# Patient Record
Sex: FEMALE | Race: WHITE | NOT HISPANIC OR LATINO | Employment: FULL TIME | URBAN - METROPOLITAN AREA
[De-identification: names, ages, dates, MRNs, and addresses within clinical notes are randomized per-mention and may not be internally consistent; named-entity substitution may affect disease eponyms.]

---

## 2024-02-23 ENCOUNTER — TRANSFERRED RECORDS (OUTPATIENT)
Dept: ONCOLOGY | Facility: CLINIC | Age: 69
End: 2024-02-23

## 2024-03-12 ENCOUNTER — HOSPITAL ENCOUNTER (OUTPATIENT)
Dept: MAMMOGRAPHY | Facility: CLINIC | Age: 69
Discharge: HOME | End: 2024-03-12
Attending: FAMILY MEDICINE | Admitting: FAMILY MEDICINE
Payer: COMMERCIAL

## 2024-03-12 DIAGNOSIS — Z12.31 VISIT FOR SCREENING MAMMOGRAM: ICD-10-CM

## 2024-03-12 PROCEDURE — 77063 BREAST TOMOSYNTHESIS BI: CPT

## 2024-03-19 ENCOUNTER — HOSPITAL ENCOUNTER (OUTPATIENT)
Dept: MAMMOGRAPHY | Facility: CLINIC | Age: 69
Discharge: HOME | End: 2024-03-19
Attending: FAMILY MEDICINE
Payer: COMMERCIAL

## 2024-03-19 DIAGNOSIS — R92.8 ABNORMAL MAMMOGRAM: ICD-10-CM

## 2024-03-19 PROCEDURE — 77062 BREAST TOMOSYNTHESIS BI: CPT

## 2024-03-19 PROCEDURE — 76642 ULTRASOUND BREAST LIMITED: CPT | Mod: LT

## 2024-03-24 ENCOUNTER — HEALTH MAINTENANCE LETTER (OUTPATIENT)
Age: 69
End: 2024-03-24

## 2024-03-28 ENCOUNTER — HOSPITAL ENCOUNTER (OUTPATIENT)
Dept: MAMMOGRAPHY | Facility: CLINIC | Age: 69
Discharge: HOME | End: 2024-03-28
Attending: FAMILY MEDICINE
Payer: COMMERCIAL

## 2024-03-28 DIAGNOSIS — R92.8 ABNORMAL MAMMOGRAM: ICD-10-CM

## 2024-03-28 PROCEDURE — 88342 IMHCHEM/IMCYTCHM 1ST ANTB: CPT | Mod: TC | Performed by: FAMILY MEDICINE

## 2024-03-28 PROCEDURE — 999N000065 MA POST PROCEDURE LEFT

## 2024-03-28 PROCEDURE — 250N000009 HC RX 250: Performed by: STUDENT IN AN ORGANIZED HEALTH CARE EDUCATION/TRAINING PROGRAM

## 2024-03-28 PROCEDURE — 272N000615 US BREAST BIOPSY CORE NEEDLE LEFT

## 2024-03-28 RX ORDER — ALENDRONATE SODIUM 10 MG/1
10 TABLET ORAL
COMMUNITY
Start: 2024-03-01 | End: 2024-10-02

## 2024-03-28 RX ADMIN — LIDOCAINE HYDROCHLORIDE 5 ML: 10 INJECTION, SOLUTION INFILTRATION; PERINEURAL at 10:12

## 2024-03-28 NOTE — DISCHARGE INSTRUCTIONS
After Your Breast Biopsy  Bleeding, bruising, and pain  Breast tenderness and some bruising is normal and may last several days. You may wear your bra overnight to support the breast.  You may use an ice pack for pain. Place it over the area for 15 to 20 minutes, several times a day.  You may take over-the-counter pain medicine:  On the day of the biopsy, we recommend Tylenol (acetaminophen) because it does not raise your risk of bleeding.  The next day, you may take an anti-inflammatory medicine (aspirin, ibuprofen, Motrin, Aleve, Advil), unless your doctor tells you not to.  Bandages and showering  Keep your bandage in place until tomorrow morning. Don't get it wet.  If you have small pieces of tape on the skin, leave them in place. They will fall off on their own, or you can remove them after 5 days.  You may shower the next morning after your biopsy.  Activity  No heavy activity (no running, no gym workouts, no lifting, no vacuuming, etc.) on the day of your biopsy.  You may go back to normal activity the next day. But limit what you do if you still have pain or discomfort.  Infection  Infection is rare. Signs of infection include:  Fever (including sweats and chills)  Redness  Pain that gets worse  Fluid draining from the biopsy site  Biopsy results  Results may take up to 5 business days.  A nurse or doctor from the Breast Center will call with your results. We will also send the results to the doctor that ordered your biopsy.  If you have not gotten your results in 5 days, please call the Breast Center.  Call the Breast Center with questions or if:   You have bleeding that lasts more than 20 minutes.  You have pain that you can't control.  You have signs of infection (fever, sweats, chills, redness, increasing pain, or drainage).  After hours, please call the doctor who ordered your biopsy.  For informational purposes only. Not to replace the advice of your health care provider. Copyright   2010 [facility]. All rights reserved. Clinically reviewed by Wendi Cruz, Director, [facility]. [employer] OPS [id number] - REV 08/23.

## 2024-04-01 PROCEDURE — 88360 TUMOR IMMUNOHISTOCHEM/MANUAL: CPT | Mod: 26 | Performed by: PATHOLOGY

## 2024-04-01 PROCEDURE — 88305 TISSUE EXAM BY PATHOLOGIST: CPT | Mod: 26 | Performed by: PATHOLOGY

## 2024-04-01 PROCEDURE — 88342 IMHCHEM/IMCYTCHM 1ST ANTB: CPT | Mod: 26 | Performed by: PATHOLOGY

## 2024-04-02 ENCOUNTER — TELEPHONE (OUTPATIENT)
Dept: MAMMOGRAPHY | Facility: CLINIC | Age: 69
End: 2024-04-02
Payer: COMMERCIAL

## 2024-04-02 NOTE — PROGRESS NOTES
Malignant Path:  Pathology report reviewed with our breast radiologist Dr. Jacqueline Coffey, who confirmed the recent breast imaging is concordant with the final surgical pathology results below.    Nurse Hafsa Blankenship from [facility] called me to inform that Hafsa Cueva PAC already called Ms. Dilma AGUILARMati Crockett this morning and notified patient of Ultrasound Guided Left Breast Biopsy results showing Invasive Ductal Carcinoma, grade 1.  Estrogen/ Progesterone Receptors are (+) positive, and HER2 is (-) negative.    I also called patient, confirmed her full name, date of birth, and we discussed her Left  Breast Needle biopsy results in great detail.    Patient states no problems with biopsy site.  Recommended follow up is Surgical Consult and Medical Oncology Consult.  Surgical Consult has been arranged with Dr Swati Pabon on 4/9/24 at 8:15 a.m., with check in time of 8:00 a.m. at the [facility].   Patient has directions and phone numbers.  We will discuss the Medical Oncology consult at patient's Surgical Consultation with Dr. Pabon.      Questions were answered and I explained my role as Breast Care Nurse Coordinator in assisting her with appointments, resources and social support.  New diagnosis information packet will be available for patient at surgical consult.  I will follow up with the patient. She has my phone number if she has further questions.  Patient verbalized understanding and agrees with the plan of care.  Ordering provider- Dr. Honey Rocha and Hafsa SHELTON have been notified of the results, recommendations for follow up, and scheduled surgical consultation.  I will forward this note, along with the pathology results.  Yakelin Carrion RN, BSN  Breast Care Nurse Coordinator  [facility] [place]  [phone]        Dilma Crockett [id number]  F,  1955  Surgical Pathology Report (Final result) [id number]  Authorizing Provider: Honey Rocha MD Ordering Provider: Honey Rocha MD  Ordering Location: [facility]  Collected: 03/28/2024 10:14 AM  Pathologist: Ky Jo MD Received: 03/28/2024 11:13 AM  .  Specimens  A Breast, Left  .  .  Final Diagnosis  LEFT breast, 1.5 cm mass, 8:00, 2.0 cm from nipple, ultrasound guided 14 gauge needle core biopsies:  - Invasive mammary (ductal) carcinoma of no special type:  - [place] grade: 1 (out of 3)  - Marko score: 4 (out of 9)  - Greatest linear length of tumor: 10 mm  - Multiple calcifications within invasive ductal carcinoma.  - In situ carcinoma: Not identified  - Lymphovascular invasions: Not identified  - Estrogen and progesterone receptors: Estrogen 100%, strong and Progesterone %, strong  - IHC HER2: Score 0  Electronically signed by Ky Jo MD on 4/1/2024 at 4:24 PM

## 2024-04-02 NOTE — TELEPHONE ENCOUNTER
Malignant Path:  Pathology report reviewed with our breast radiologist Dr. Jacqueline Coffey, who confirmed the recent breast imaging is concordant with the final surgical pathology results below.    Ultrasound Guided Left Breast Biopsy results show Invasive Ductal Carcinoma, grade 1.  Estrogen/ Progesterone Receptors are (+) positive, and HER2 is (-) negative.  Recommended follow up is Surgical and Medical Oncology consultations.    I called Dr. Rocha's office at [facility] ([phone]) and spoke with Dr. Rocha's nurse Hafsa Blankenship.   Informed Hafsa Blankenship of patient's breast biopsy results and the recommendations for patient to see surgery and Medical Oncology.  I faxed the report to Dr. Rocha at [phone].    Hafsa Blankenship will show Dr. Rocha the pathology results and call me back to ask for help in getting patient scheduled for consultations.  Awaiting return call.  Yakelin Carrion RN, BSN  Breast Care Nurse Coordinator  [facility] [place]  [phone]        Dilma Crockett [id number]  F, 1955  Surgical Pathology Report (Final result) [id number]  Authorizing Provider: Honey Rocha MD Ordering Provider: Honey Rocha MD  Ordering Location: [facility]  Collected: 03/28/2024 10:14 AM  Pathologist: Ky Jo MD Received: 03/28/2024 11:13 AM  .  Specimens  A Breast, Left  .  .  Final Diagnosis  LEFT breast, 1.5 cm mass, 8:00, 2.0 cm from nipple, ultrasound guided 14 gauge needle core biopsies:  - Invasive mammary (ductal) carcinoma of no special type:  - Marko grade: 1 (out of 3)  - [place] score: 4 (out of 9)  - Greatest linear length of tumor: 10 mm  - Multiple calcifications within invasive ductal carcinoma.  - In situ carcinoma: Not identified  - Lymphovascular invasions: Not identified  - Estrogen and progesterone receptors: Estrogen 100%, strong and Progesterone %,  strong  - IHC HER2: Score 0  Electronically signed by Ky Jo MD on 4/1/2024 at 4:24 PM

## 2024-04-02 NOTE — TELEPHONE ENCOUNTER
Dr. Rocha's nurse - Hafsa Blankenship called me back to inform that Hafsa SHELTON from [facility] (Dr. Rocha's office) already called patient this morning, and informed her of the biopsy results, recommendations for follow up, and patient is already scheduled for Surgical Consult with Dr. Swati Pabon on 4/9/24 at 8:15 a.m. at [facility].    I called patient as well and explained her surgical pathology results in great detail.  I answered all of patient's questions completely to her satisfaction.  I explained my role as nurse navigator and informed Dilma I will sit in on her consult to take notes and give her information regarding breast cancer and the various resources available in the [facility].    Patient has Dr. Pabon's clinic contact information and directions.  Patient also has my contact information.  Patient verbalizes understanding and agrees with the plan of care.  Yakelin Carrion RN BSN  Breast Nurse Care Coordinator  [facility] [place] [place]  [phone]

## 2024-04-05 ENCOUNTER — PATIENT OUTREACH (OUTPATIENT)
Dept: ONCOLOGY | Facility: CLINIC | Age: 69
End: 2024-04-05

## 2024-04-05 ENCOUNTER — OFFICE VISIT (OUTPATIENT)
Dept: SURGERY | Facility: CLINIC | Age: 69
End: 2024-04-05
Payer: COMMERCIAL

## 2024-04-05 ENCOUNTER — TELEPHONE (OUTPATIENT)
Dept: SURGERY | Facility: CLINIC | Age: 69
End: 2024-04-05

## 2024-04-05 VITALS
HEIGHT: 61 IN | DIASTOLIC BLOOD PRESSURE: 80 MMHG | BODY MASS INDEX: 23.6 KG/M2 | WEIGHT: 125 LBS | SYSTOLIC BLOOD PRESSURE: 128 MMHG | HEART RATE: 68 BPM

## 2024-04-05 DIAGNOSIS — Z17.0 MALIGNANT NEOPLASM OF LOWER-INNER QUADRANT OF LEFT BREAST IN FEMALE, ESTROGEN RECEPTOR POSITIVE (H): Primary | ICD-10-CM

## 2024-04-05 DIAGNOSIS — C50.312 MALIGNANT NEOPLASM OF LOWER-INNER QUADRANT OF LEFT BREAST IN FEMALE, ESTROGEN RECEPTOR POSITIVE (H): Primary | ICD-10-CM

## 2024-04-05 PROCEDURE — 99205 OFFICE O/P NEW HI 60 MIN: CPT | Performed by: SURGERY

## 2024-04-05 NOTE — NURSING NOTE
Breast Nurse Care Coordination:      I introduced self to patient and , and explained my role of breast nurse coordinator. I accompanied Dilma to her surgical consultation on 4/5/24 with Dr. Pabon at the [facility].       Dilma was given the new breast cancer patient packet which includes educational material and support resources such as American Cancer Society, Fighting Cancer through Diet and Lifestyle, [employer], [employer], Pathways and the [facility] Breast Cancer Support Group.  I also enclosed a copy of her left breast biopsy pathology report (3/28/24).       At the end of the consultation, we reviewed Dilma's plan of care and education.  The plan is for patient to have a left breast lumpectomy on 5/3/24. She is aware that she will need a pre op exam with her PCP within 30 days before surgery.     I gave patient educational materials regarding Exercises After Breast Surgery and Lumpectomy.  Informed patient for lumpectomy surgery, she will want to have two good supportive sports bras that open in the front to wear the 2 weeks following her surgery. I gave patient information on different options to purchase sports bras.     I answered her questions and encouraged patient to call me back with any future questions or concerns.  Dilma has my contact information, and knows to contact me in the future with any questions or concerns.     Therese Del Toro, RN, BSN, PHN  Breast Care Nurse Coordinator  [facility] [place] [place]  [phone]

## 2024-04-05 NOTE — PROGRESS NOTES
Writer received referral to Cancer Risk Management/Genetic Counseling.    Referred for:     genetic testing - pt with breast cancer        Referral reviewed for appropriate plan, and sent to New Patient Scheduling ([phone]) for completion.    Katerin Loredo, RN, BSN  Oncology New Patient Nurse Navigator   [facility]  [phone]

## 2024-04-05 NOTE — NURSING NOTE
Breast Patients      1-Do you have any of the following symptoms? Lump(s) or Mass(es)  2-In which breast are you having the symptoms? left  3-Have you had a Mammogram? Ramin Reece - Date:  3/12/24  4-Have you ever had a breast cyst drained? No  5-Have you ever had a breast biopsy? Yes:  Left   -   Date:  3/28/24  6-Have you ever had Breast Cancer? No   7-Is there a history of Breast Cancer in your family? Yes   Relationship to you:    Grandmother  8-Have you ever had Ovarian Cancer? No  9-Is there a history of Ovarian Cancer in your family? No  10-Is there a history of Colon Cancer in your family?  No  11-Is there a history of Uterine Cancer in your family?  No  12-Any known genetic abnormalities in your family?  No  13-Summarize your caffeine intake (i.e. coffee, tea, chocolate, soda etc.): 1 small jose eduardo tea per day, occasional soda       14-What age did your periods begin?  12    15-Date your last menstrual period began?  ?  16-Number of full-term pregnancies:  3    17-Your age when your first child was born? 31  18-Did you nurse your children? Yes  19-Are you pregnant now? No  20-Have you begun menopause? Yes, s/p hysterectomy in 1998  21-Have you had either ovary removed?Yes  Date of Surgery:  1998  22-Do you have breast implants? No   23-Have you used hormone replacement therapy?  No  24-Have you taken oral contraceptive pills?  Yes, For how many years?  5 years   25-Have you had an intrauterine device (IUD) placed?  No  26-What is your current bra size?  36C    Jaimee Badillo MA

## 2024-04-05 NOTE — TELEPHONE ENCOUNTER
Type of surgery: left breast tag localized lumpectomy  Location of surgery: [facility]  Date and time of surgery: 5/3/24 7:30am  Surgeon: Dr Pabon  Pre-Op Appt Date: pt to schedule  Post-Op Appt Date: pt to schedule   Packet sent out: Yes  Pre-cert/Authorization completed:  Not Applicable  Date: 4/5/24

## 2024-04-05 NOTE — PROGRESS NOTES
[facility] Breast Surgery Consultation    HPI:   Dilma Crockett is a 69 year old female who is seen in consultation at the request of Dr. Rocha for evaluation of newly diagnosed left breast grade 1 invasive ductal carcinoma , %, NE %, HER 2 negative measuring 1.5cm at 8:00, 2cm FN.     She had a screening mammogram on 3/12/2024 which revealed a possible mass in the left breast as well as an asymmetry in the right breast at 6:00.     Diagnostic imaging revealed an irregular mass with calcifications at 8:00, 2cm FN measuring 1.5cm. the asymmetry at 9:00 in the left breast and in the right breast did not persist on spot compression. Left axillary ultrasound was benign.     She reports no breast concerns prior to her screening mammogram.  She has not had any prior breast biopsies or breast surgeries.  She has a personal history of melanoma on her cheek in the past as well as basal cell carcinomas which have been excised.    Hormonal history:   menarche 12, 3 children, 1st at age 31, post menopausal, 5 years OCP use, no HRT, no fertility treatment.     Family history of breast cancer: Yes -maternal grandmother in her 80s  Family history of ovarian cancer:  No  Family history of colon cancer: No  Family history of prostate cancer: No      Past Medical History:   has a past medical history of Skin cancer.      Current Outpatient Medications:     alendronate (FOSAMAX) 10 MG tablet, , Disp: , Rfl:     Past Surgical History:  Past Surgical History:   Procedure Laterality Date    ABDOMEN SURGERY          EXTRACORPOREAL SHOCK WAVE LITHOTRIPSY (ESWL) Bilateral 2014    Procedure: EXTRACORPOREAL SHOCK WAVE LITHOTRIPSY (ESWL);  Surgeon: Donato Baldwin MD;  Location:  OR    GYN SURGERY      SOFT TISSUE SURGERY      Mohs procedure           Allergies   Allergen Reactions    Sulfa Antibiotics          Social History:  Social History     Socioeconomic History    Marital status:   "    Spouse name: Not on file    Number of children: Not on file    Years of education: Not on file    Highest education level: Not on file   Occupational History    Not on file   Tobacco Use    Smoking status: Never    Smokeless tobacco: Not on file   Substance and Sexual Activity    Alcohol use: Yes     Comment: occ    Drug use: No    Sexual activity: Not on file   Other Topics Concern    Not on file   Social History Narrative    Not on file     Social Determinants of Health     Financial Resource Strain: Not on file   Food Insecurity: Not on file   Transportation Needs: Not on file   Physical Activity: Not on file   Stress: Not on file   Social Connections: Not on file   Interpersonal Safety: Not on file   Housing Stability: Not on file        ROS:  The 10 point review of systems is negative other than noted in the HPI and above.    PE:  Vitals: /80   Pulse 68   Ht 1.544 m (5' 0.8\")   Wt 56.7 kg (125 lb)   BMI 23.77 kg/m    General appearance: well-nourished, sitting comfortably, no apparent distress  Psych: normal affect, pleasant  HEENT:  Head normocephalic and atraumatic, pupils equal and round, conjunctivae clear, mucous membranes moist, external ears and nose normal  Neck: Supple without thyromegaly or masses  Lungs: Respirations unlabored  Lymphatic: No cervical, or supraclavicular lymphadenopathy  Extremities: Without edema  Musculoskeletal:  Normal station and gait  Neurologic: nonfocal, grossly intact times four extremities, alert and oriented times three  Psychiatric: Mood and affect are appropriate  Skin: Without lesions or rashes    Breast:  A bilateral breast exam was performed in the supine position.. Bilateral breasts were palpated in a circumferential clockwise fashion including the supraclavicular and axillary areas.   Breast appear symmetric.  There is moderate ptosis bilaterally.  Skin ecchymosis on the left lower inner breast consistent with recent biopsy.  Nipples are everted and " appear normal bilaterally.  There are no palpable masses in either breast specifically at the site of her known cancer.      Lymph:       No supraclavicular/infraclavicular adenopathy.   Axillary adenopathy: none    Assessment:  left breast grade 1 invasive ductal carcinoma , %, MS %, HER 2 negative measuring 1.5cm at 8:00, 2cm FN.     Plan:   Dilma Crockett is a 69 year old female has newly diagnosed left breast cancer.  I reviewed the imaging and pathology reports with her and her  and explained the findings.  We talked about the fact that this is invasive ductal carcinoma  that is small in size and was found on screening mammogram.  We discussed the receptor status. We discussed that breast cancer is treated in a multidisciplinary fashion and she will also meet with oncology as well as radiation oncology pending surgical decision making and final pathology results.  We discussed that we would help her schedule with medical oncology as well as radiation oncology following surgery as that would be very helpful to have final surgical pathology.    We next discussed the surgical options for treatment.  I described the procedures for tag localized lumpectomy with or without sentinel lymph node biopsy and mastectomy with sentinel lymph node biopsy, possible axillary node dissection including the details of the procedures, the risks, anesthesia and expected recovery.      I reviewed the data regarding lumpectomy and radiation vs mastectomy that shows that the local recurrence risk is slightly higher for lumpectomy and radiation vs mastectomy (3-5% vs. 1-2%), but the survival at 20 years is the same.  I also explained that since this is a small tumor and was not palpable on clinical breast exam prior to the biopsy, I would ask radiology to place a radiofrequency ID tag pre-operatively for localization. We discussed the role of oncoplastic lumpectomy. We discussed the risk of margin positivity  following partial mastectomy surgery and need for possible return to the operating room for additional procedures if margins are positive.     I advised that lymph node biopsy is recommended whenever we are dealing with invasive breast cancer and described the procedure for sentinel lymph node biopsy.  We discussed that in women over 70 it is reasonable to consider omission of SLNB.  We discussed with the tumor biology of this cancer I would be very comfortable omitting sentinel lymph node biopsy for her.  We did discuss if there were any change in final pathologic results we could always return to the operating room and perform this as a separate procedure.  We discussed the use of technetium and sometimes methylene blue to identify sentinel nodes. We talked about the risk for lymphedema which is small with removal of only a few nodes, but certainly not zero.      We talked about post-lumpectomy radiation, the course and usual side effects. We discussed that with lumpectomy, radiation is typically recommended to decrease risk of recurrence. It may be necessary following mastectomy depending on final pathology and if salena involvement is present. We discussed possibility of omitting radiation as well given age >70 and a small mass.  Final pathology will guide if radiation is recommended.      In addition, I have recommended genetic counseling.  She would be a candidate in that situation based on her cancer diagnosis and family history.  A referral for genetic counselor was placed.       We discussed the role of oncotype for hormone positive, HER 2 negative cancers with negative sentinel nodes or 1-3 positive nodes.     Plan:   Genetics referral  Tag localized lumpectomy      60 minutes total time spent on the date of this encounter doing: chart review, review of test results, patient visit, physical exam, education, counseling, developing plan of care, and documenting.    Swati Pabon MD

## 2024-04-09 ENCOUNTER — MYC MEDICAL ADVICE (OUTPATIENT)
Dept: MAMMOGRAPHY | Facility: CLINIC | Age: 69
End: 2024-04-09

## 2024-04-19 LAB
PATH REPORT.ADDENDUM SPEC: ABNORMAL
PATH REPORT.COMMENTS IMP SPEC: ABNORMAL
PATH REPORT.COMMENTS IMP SPEC: YES
PATH REPORT.FINAL DX SPEC: ABNORMAL
PATH REPORT.GROSS SPEC: ABNORMAL
PATH REPORT.MICROSCOPIC SPEC OTHER STN: ABNORMAL
PATH REPORT.MICROSCOPIC SPEC OTHER STN: ABNORMAL
PATH REPORT.RELEVANT HX SPEC: ABNORMAL
PATHOLOGY SYNOPTIC REPORT: ABNORMAL
PHOTO IMAGE: ABNORMAL

## 2024-04-22 ENCOUNTER — HOSPITAL ENCOUNTER (OUTPATIENT)
Dept: MAMMOGRAPHY | Facility: CLINIC | Age: 69
Discharge: HOME | End: 2024-04-22
Attending: SURGERY
Payer: COMMERCIAL

## 2024-04-22 ENCOUNTER — TRANSFERRED RECORDS (OUTPATIENT)
Dept: HEALTH INFORMATION MANAGEMENT | Facility: CLINIC | Age: 69
End: 2024-04-22

## 2024-04-22 DIAGNOSIS — Z17.0 MALIGNANT NEOPLASM OF LOWER-INNER QUADRANT OF LEFT BREAST IN FEMALE, ESTROGEN RECEPTOR POSITIVE (H): ICD-10-CM

## 2024-04-22 DIAGNOSIS — C50.312 MALIGNANT NEOPLASM OF LOWER-INNER QUADRANT OF LEFT BREAST IN FEMALE, ESTROGEN RECEPTOR POSITIVE (H): ICD-10-CM

## 2024-04-22 PROCEDURE — 999N000065 MA POST PROCEDURE LEFT

## 2024-04-22 PROCEDURE — 250N000009 HC RX 250: Performed by: RADIOLOGY

## 2024-04-22 PROCEDURE — 19285 PERQ DEV BREAST 1ST US IMAG: CPT | Mod: LT

## 2024-04-22 RX ADMIN — LIDOCAINE HYDROCHLORIDE 5 ML: 10 INJECTION, SOLUTION INFILTRATION; PERINEURAL at 13:42

## 2024-04-22 NOTE — DISCHARGE INSTRUCTIONS
What to Do after Localizer Placement    Your care team has placed a localizer tag in your body.    Here's what to expect and what you should do for the next few days.    Bleeding or bruising  A  little bruising is normal.    If you bleed through the bandage, put direct pressure on the site.   If you're still bleeding after 20 minutes, call the doctor who ordered the localizer.    Caring for you bandage  Keep your bandage on until tomorrow morning.    Do not get it wet.    Showering  Don't shower today.  Tomorrow, you may remove the bandage and shower.      If you have pain or discomfort  Place an ice pack on the sore area for 15 to 20 minutes, several times a day.    What to do for infection      Infection is rare.  Signs of infection include:  Fever  Redness  Pain getting worse  Fluid draining from the site.      If you have any of these symptoms, please call the doctor who ordered the localizer.      When to call the doctor   Call the doctor who ordered the localizer if:  You have bleeding that lasts more than 20 minutes.  You have pain that you can't control.  You have signs of infection (fever, redness, drainage or other signs).      Please call one of our nurses if you have questions or concerns.   [facility]   Nurse Navigator  [phone]  Procedure Nurse  [phone]

## 2024-04-30 NOTE — OR NURSING
Attempted to contact with pre-op instructions, VM full and no answer.    Update Reached patient and given all instructions for procedure 5/3/24

## 2024-05-03 ENCOUNTER — HOSPITAL ENCOUNTER (OUTPATIENT)
Facility: CLINIC | Age: 69
Discharge: HOME | End: 2024-05-03
Attending: SURGERY | Admitting: SURGERY
Payer: COMMERCIAL

## 2024-05-03 ENCOUNTER — APPOINTMENT (OUTPATIENT)
Dept: SURGERY | Facility: PHYSICIAN GROUP | Age: 69
End: 2024-05-03
Payer: COMMERCIAL

## 2024-05-03 ENCOUNTER — HOSPITAL ENCOUNTER (OUTPATIENT)
Dept: MAMMOGRAPHY | Facility: CLINIC | Age: 69
Discharge: HOME | End: 2024-05-03
Attending: SURGERY
Payer: COMMERCIAL

## 2024-05-03 ENCOUNTER — ANESTHESIA (OUTPATIENT)
Dept: SURGERY | Facility: CLINIC | Age: 69
End: 2024-05-03
Payer: COMMERCIAL

## 2024-05-03 ENCOUNTER — ANESTHESIA EVENT (OUTPATIENT)
Dept: SURGERY | Facility: CLINIC | Age: 69
End: 2024-05-03
Payer: COMMERCIAL

## 2024-05-03 VITALS
RESPIRATION RATE: 12 BRPM | HEART RATE: 51 BPM | TEMPERATURE: 96.9 F | DIASTOLIC BLOOD PRESSURE: 69 MMHG | SYSTOLIC BLOOD PRESSURE: 145 MMHG | BODY MASS INDEX: 24.35 KG/M2 | OXYGEN SATURATION: 96 % | HEIGHT: 60 IN | WEIGHT: 124 LBS

## 2024-05-03 DIAGNOSIS — C50.312 MALIGNANT NEOPLASM OF LOWER-INNER QUADRANT OF LEFT BREAST IN FEMALE, ESTROGEN RECEPTOR POSITIVE (H): ICD-10-CM

## 2024-05-03 DIAGNOSIS — C50.912 INVASIVE DUCTAL CARCINOMA OF LEFT BREAST (H): Primary | ICD-10-CM

## 2024-05-03 DIAGNOSIS — Z17.0 MALIGNANT NEOPLASM OF LOWER-INNER QUADRANT OF LEFT BREAST IN FEMALE, ESTROGEN RECEPTOR POSITIVE (H): ICD-10-CM

## 2024-05-03 PROCEDURE — 88342 IMHCHEM/IMCYTCHM 1ST ANTB: CPT | Mod: TC | Performed by: SURGERY

## 2024-05-03 PROCEDURE — 88305 TISSUE EXAM BY PATHOLOGIST: CPT | Mod: 26 | Performed by: PATHOLOGY

## 2024-05-03 PROCEDURE — 710N000012 HC RECOVERY PHASE 2, PER MINUTE: Performed by: SURGERY

## 2024-05-03 PROCEDURE — 19301 PARTIAL MASTECTOMY: CPT | Mod: LT | Performed by: SURGERY

## 2024-05-03 PROCEDURE — 250N000011 HC RX IP 250 OP 636: Performed by: SURGERY

## 2024-05-03 PROCEDURE — 999N000141 HC STATISTIC PRE-PROCEDURE NURSING ASSESSMENT: Performed by: SURGERY

## 2024-05-03 PROCEDURE — 250N000013 HC RX MED GY IP 250 OP 250 PS 637: Performed by: ANESTHESIOLOGY

## 2024-05-03 PROCEDURE — 88307 TISSUE EXAM BY PATHOLOGIST: CPT | Mod: 26 | Performed by: PATHOLOGY

## 2024-05-03 PROCEDURE — 14301 TIS TRNFR ANY 30.1-60 SQ CM: CPT | Mod: 51 | Performed by: SURGERY

## 2024-05-03 PROCEDURE — 370N000017 HC ANESTHESIA TECHNICAL FEE, PER MIN: Performed by: SURGERY

## 2024-05-03 PROCEDURE — 710N000009 HC RECOVERY PHASE 1, LEVEL 1, PER MIN: Performed by: SURGERY

## 2024-05-03 PROCEDURE — 272N000001 HC OR GENERAL SUPPLY STERILE: Performed by: SURGERY

## 2024-05-03 PROCEDURE — 19301 PARTIAL MASTECTOMY: CPT | Mod: AS | Performed by: PHYSICIAN ASSISTANT

## 2024-05-03 PROCEDURE — 999N000104 MA BREAST SPECIMEN LEFT OR

## 2024-05-03 PROCEDURE — 19301 PARTIAL MASTECTOMY: CPT | Performed by: NURSE ANESTHETIST, CERTIFIED REGISTERED

## 2024-05-03 PROCEDURE — 19301 PARTIAL MASTECTOMY: CPT | Performed by: ANESTHESIOLOGY

## 2024-05-03 PROCEDURE — 88342 IMHCHEM/IMCYTCHM 1ST ANTB: CPT | Mod: 26 | Performed by: PATHOLOGY

## 2024-05-03 PROCEDURE — 250N000011 HC RX IP 250 OP 636: Performed by: NURSE ANESTHETIST, CERTIFIED REGISTERED

## 2024-05-03 PROCEDURE — 258N000003 HC RX IP 258 OP 636: Performed by: NURSE ANESTHETIST, CERTIFIED REGISTERED

## 2024-05-03 PROCEDURE — 250N000009 HC RX 250: Performed by: NURSE ANESTHETIST, CERTIFIED REGISTERED

## 2024-05-03 PROCEDURE — 360N000083 HC SURGERY LEVEL 3 W/ FLUORO, PER MIN: Performed by: SURGERY

## 2024-05-03 PROCEDURE — 250N000009 HC RX 250: Performed by: SURGERY

## 2024-05-03 PROCEDURE — 14301 TIS TRNFR ANY 30.1-60 SQ CM: CPT | Mod: AS | Performed by: PHYSICIAN ASSISTANT

## 2024-05-03 PROCEDURE — 258N000003 HC RX IP 258 OP 636: Performed by: ANESTHESIOLOGY

## 2024-05-03 RX ORDER — TRAMADOL HYDROCHLORIDE 50 MG/1
50 TABLET ORAL EVERY 6 HOURS PRN
Qty: 15 TABLET | Refills: 0 | Status: SHIPPED | OUTPATIENT
Start: 2024-05-03 | End: 2024-05-08

## 2024-05-03 RX ORDER — CEFAZOLIN SODIUM/WATER 2 G/20 ML
2 SYRINGE (ML) INTRAVENOUS SEE ADMIN INSTRUCTIONS
Status: DISCONTINUED | OUTPATIENT
Start: 2024-05-03 | End: 2024-05-03 | Stop reason: HOSPADM

## 2024-05-03 RX ORDER — LIDOCAINE HYDROCHLORIDE 10 MG/ML
INJECTION, SOLUTION INFILTRATION; PERINEURAL
Status: DISCONTINUED
Start: 2024-05-03 | End: 2024-05-03 | Stop reason: HOSPADM

## 2024-05-03 RX ORDER — HYDROMORPHONE HCL IN WATER/PF 6 MG/30 ML
0.4 PATIENT CONTROLLED ANALGESIA SYRINGE INTRAVENOUS EVERY 5 MIN PRN
Status: DISCONTINUED | OUTPATIENT
Start: 2024-05-03 | End: 2024-05-03 | Stop reason: HOSPADM

## 2024-05-03 RX ORDER — PROPOFOL 10 MG/ML
INJECTION, EMULSION INTRAVENOUS PRN
Status: DISCONTINUED | OUTPATIENT
Start: 2024-05-03 | End: 2024-05-03

## 2024-05-03 RX ORDER — CEFAZOLIN SODIUM/WATER 2 G/20 ML
2 SYRINGE (ML) INTRAVENOUS
Status: COMPLETED | OUTPATIENT
Start: 2024-05-03 | End: 2024-05-03

## 2024-05-03 RX ORDER — FENTANYL CITRATE 50 UG/ML
25 INJECTION, SOLUTION INTRAMUSCULAR; INTRAVENOUS EVERY 5 MIN PRN
Status: DISCONTINUED | OUTPATIENT
Start: 2024-05-03 | End: 2024-05-03 | Stop reason: HOSPADM

## 2024-05-03 RX ORDER — HYDROMORPHONE HCL IN WATER/PF 6 MG/30 ML
0.2 PATIENT CONTROLLED ANALGESIA SYRINGE INTRAVENOUS EVERY 5 MIN PRN
Status: DISCONTINUED | OUTPATIENT
Start: 2024-05-03 | End: 2024-05-03 | Stop reason: HOSPADM

## 2024-05-03 RX ORDER — FENTANYL CITRATE 50 UG/ML
50 INJECTION, SOLUTION INTRAMUSCULAR; INTRAVENOUS EVERY 5 MIN PRN
Status: DISCONTINUED | OUTPATIENT
Start: 2024-05-03 | End: 2024-05-03 | Stop reason: HOSPADM

## 2024-05-03 RX ORDER — PROPOFOL 10 MG/ML
INJECTION, EMULSION INTRAVENOUS CONTINUOUS PRN
Status: DISCONTINUED | OUTPATIENT
Start: 2024-05-03 | End: 2024-05-03

## 2024-05-03 RX ORDER — AMOXICILLIN 250 MG
1-2 CAPSULE ORAL 2 TIMES DAILY
Qty: 15 TABLET | Refills: 0 | Status: SHIPPED | OUTPATIENT
Start: 2024-05-03 | End: 2024-05-21

## 2024-05-03 RX ORDER — NALOXONE HYDROCHLORIDE 0.4 MG/ML
0.1 INJECTION, SOLUTION INTRAMUSCULAR; INTRAVENOUS; SUBCUTANEOUS
Status: DISCONTINUED | OUTPATIENT
Start: 2024-05-03 | End: 2024-05-03 | Stop reason: HOSPADM

## 2024-05-03 RX ORDER — BUPIVACAINE HYDROCHLORIDE 2.5 MG/ML
INJECTION, SOLUTION EPIDURAL; INFILTRATION; INTRACAUDAL
Status: DISCONTINUED
Start: 2024-05-03 | End: 2024-05-03 | Stop reason: HOSPADM

## 2024-05-03 RX ORDER — ACETAMINOPHEN 500 MG
1000 TABLET ORAL ONCE
Status: COMPLETED | OUTPATIENT
Start: 2024-05-03 | End: 2024-05-03

## 2024-05-03 RX ORDER — SODIUM CHLORIDE, SODIUM LACTATE, POTASSIUM CHLORIDE, CALCIUM CHLORIDE 600; 310; 30; 20 MG/100ML; MG/100ML; MG/100ML; MG/100ML
INJECTION, SOLUTION INTRAVENOUS CONTINUOUS
Status: DISCONTINUED | OUTPATIENT
Start: 2024-05-03 | End: 2024-05-03 | Stop reason: HOSPADM

## 2024-05-03 RX ORDER — LIDOCAINE 40 MG/G
CREAM TOPICAL
Status: DISCONTINUED | OUTPATIENT
Start: 2024-05-03 | End: 2024-05-03 | Stop reason: HOSPADM

## 2024-05-03 RX ORDER — FENTANYL CITRATE 50 UG/ML
INJECTION, SOLUTION INTRAMUSCULAR; INTRAVENOUS PRN
Status: DISCONTINUED | OUTPATIENT
Start: 2024-05-03 | End: 2024-05-03

## 2024-05-03 RX ORDER — SODIUM CHLORIDE, SODIUM LACTATE, POTASSIUM CHLORIDE, CALCIUM CHLORIDE 600; 310; 30; 20 MG/100ML; MG/100ML; MG/100ML; MG/100ML
INJECTION, SOLUTION INTRAVENOUS CONTINUOUS PRN
Status: DISCONTINUED | OUTPATIENT
Start: 2024-05-03 | End: 2024-05-03

## 2024-05-03 RX ORDER — HALOPERIDOL 5 MG/ML
1 INJECTION INTRAMUSCULAR
Status: DISCONTINUED | OUTPATIENT
Start: 2024-05-03 | End: 2024-05-03 | Stop reason: HOSPADM

## 2024-05-03 RX ORDER — ONDANSETRON 2 MG/ML
INJECTION INTRAMUSCULAR; INTRAVENOUS PRN
Status: DISCONTINUED | OUTPATIENT
Start: 2024-05-03 | End: 2024-05-03

## 2024-05-03 RX ORDER — ONDANSETRON 4 MG/1
4 TABLET, ORALLY DISINTEGRATING ORAL EVERY 30 MIN PRN
Status: DISCONTINUED | OUTPATIENT
Start: 2024-05-03 | End: 2024-05-03 | Stop reason: HOSPADM

## 2024-05-03 RX ORDER — LIDOCAINE HYDROCHLORIDE 20 MG/ML
INJECTION, SOLUTION INFILTRATION; PERINEURAL PRN
Status: DISCONTINUED | OUTPATIENT
Start: 2024-05-03 | End: 2024-05-03

## 2024-05-03 RX ORDER — ONDANSETRON 2 MG/ML
4 INJECTION INTRAMUSCULAR; INTRAVENOUS EVERY 30 MIN PRN
Status: DISCONTINUED | OUTPATIENT
Start: 2024-05-03 | End: 2024-05-03 | Stop reason: HOSPADM

## 2024-05-03 RX ORDER — MAGNESIUM HYDROXIDE 1200 MG/15ML
LIQUID ORAL PRN
Status: DISCONTINUED | OUTPATIENT
Start: 2024-05-03 | End: 2024-05-03 | Stop reason: HOSPADM

## 2024-05-03 RX ORDER — TRAMADOL HYDROCHLORIDE 50 MG/1
50 TABLET ORAL
Status: DISCONTINUED | OUTPATIENT
Start: 2024-05-03 | End: 2024-05-03 | Stop reason: HOSPADM

## 2024-05-03 RX ORDER — DEXAMETHASONE SODIUM PHOSPHATE 4 MG/ML
INJECTION, SOLUTION INTRA-ARTICULAR; INTRALESIONAL; INTRAMUSCULAR; INTRAVENOUS; SOFT TISSUE PRN
Status: DISCONTINUED | OUTPATIENT
Start: 2024-05-03 | End: 2024-05-03

## 2024-05-03 RX ADMIN — SODIUM CHLORIDE, POTASSIUM CHLORIDE, SODIUM LACTATE AND CALCIUM CHLORIDE: 600; 310; 30; 20 INJECTION, SOLUTION INTRAVENOUS at 07:26

## 2024-05-03 RX ADMIN — PHENYLEPHRINE HYDROCHLORIDE 100 MCG: 10 INJECTION INTRAVENOUS at 07:58

## 2024-05-03 RX ADMIN — PHENYLEPHRINE HYDROCHLORIDE 100 MCG: 10 INJECTION INTRAVENOUS at 07:38

## 2024-05-03 RX ADMIN — FENTANYL CITRATE 50 MCG: 50 INJECTION INTRAMUSCULAR; INTRAVENOUS at 07:29

## 2024-05-03 RX ADMIN — LIDOCAINE HYDROCHLORIDE 100 MG: 20 INJECTION, SOLUTION INFILTRATION; PERINEURAL at 07:29

## 2024-05-03 RX ADMIN — SODIUM CHLORIDE, POTASSIUM CHLORIDE, SODIUM LACTATE AND CALCIUM CHLORIDE: 600; 310; 30; 20 INJECTION, SOLUTION INTRAVENOUS at 06:26

## 2024-05-03 RX ADMIN — FENTANYL CITRATE 50 MCG: 50 INJECTION INTRAMUSCULAR; INTRAVENOUS at 07:37

## 2024-05-03 RX ADMIN — PROPOFOL 200 MG: 10 INJECTION, EMULSION INTRAVENOUS at 07:29

## 2024-05-03 RX ADMIN — PROPOFOL 200 MCG/KG/MIN: 10 INJECTION, EMULSION INTRAVENOUS at 07:31

## 2024-05-03 RX ADMIN — Medication 2 G: at 07:26

## 2024-05-03 RX ADMIN — DEXAMETHASONE SODIUM PHOSPHATE 4 MG: 4 INJECTION, SOLUTION INTRA-ARTICULAR; INTRALESIONAL; INTRAMUSCULAR; INTRAVENOUS; SOFT TISSUE at 07:40

## 2024-05-03 RX ADMIN — MIDAZOLAM 1 MG: 1 INJECTION INTRAMUSCULAR; INTRAVENOUS at 07:29

## 2024-05-03 RX ADMIN — ONDANSETRON 4 MG: 2 INJECTION INTRAMUSCULAR; INTRAVENOUS at 08:04

## 2024-05-03 RX ADMIN — ACETAMINOPHEN 1000 MG: 500 TABLET, FILM COATED ORAL at 09:21

## 2024-05-03 RX ADMIN — PHENYLEPHRINE HYDROCHLORIDE 100 MCG: 10 INJECTION INTRAVENOUS at 07:49

## 2024-05-03 ASSESSMENT — ACTIVITIES OF DAILY LIVING (ADL)
ADLS_ACUITY_SCORE: 35

## 2024-05-03 ASSESSMENT — ENCOUNTER SYMPTOMS
SEIZURES: 0
DYSRHYTHMIAS: 0

## 2024-05-03 ASSESSMENT — LIFESTYLE VARIABLES: TOBACCO_USE: 0

## 2024-05-03 NOTE — BRIEF OP NOTE
[facility]    Brief Operative Note    Pre-operative diagnosis: Malignant neoplasm of lower-inner quadrant of left breast in female, estrogen receptor positive (H) [C50.312, Z17.0]  Post-operative diagnosis Left Breast Invasive Ductal Carcinoma    Procedure: Left breast tag localized lumpectomy, Left - Breast    Surgeon: Surgeons and Role:     * Swati Pabon MD - Primary     * Mehdi Zarate PA-C - Assisting    Anesthesia: MAC   Estimated Blood Loss: Less than 10 ml    Drains: None  Specimens:   ID Type Source Tests Collected by Time Destination   1 : LEFT BREAST LUMPECTOMY, RFID TAG Lumpectomy Breast, Left SURGICAL PATHOLOGY EXAM Swati Pabon MD 5/3/2024  8:01 AM    2 : LEFT BREAST LATERAL MARGIN, INK AT NEW MARGIN Lumpectomy Breast, Left SURGICAL PATHOLOGY EXAM Swati Pabon MD 5/3/2024  8:02 AM      Findings:   None.  See Operative Report for full details.  Complications: None.  Implants: * No implants in log *      Misha Zarate PA-C  [phone]

## 2024-05-03 NOTE — ANESTHESIA POSTPROCEDURE EVALUATION
Patient: Dilma Crockett    Procedure: Procedure(s):  Left breast tag localized lumpectomy       Anesthesia Type:  General    Note:  Disposition: Outpatient   Postop Pain Control: Uneventful            Sign Out: Well controlled pain   PONV: No   Neuro/Psych: Uneventful            Sign Out: Acceptable/Baseline neuro status   Airway/Respiratory: Uneventful            Sign Out: Acceptable/Baseline resp. status   CV/Hemodynamics: Uneventful            Sign Out: Acceptable CV status; No obvious hypovolemia; No obvious fluid overload   Other NRE: NONE   DID A NON-ROUTINE EVENT OCCUR? No           Last vitals:  Vitals Value Taken Time   /83 05/03/24 0915   Temp 35.9  C (96.7  F) 05/03/24 0842   Pulse 52 05/03/24 0919   Resp 13 05/03/24 0919   SpO2 98 % 05/03/24 0919   Vitals shown include unfiled device data.    Electronically Signed By: Taryn Smith MD  May 3, 2024  9:20 AM

## 2024-05-03 NOTE — OR NURSING
Discharge instructions given to  Moi and daughter. Patient and family stated readiness for discharge. Patient vital signs stable. Dressing CDI. Voided prior discharge. No questions and concerns at thi time.

## 2024-05-03 NOTE — OP NOTE
[facility] Breast Surgery Operative Note      Pre-operative diagnosis: Left breast invasive ductal carcinoma   Post-operative diagnosis: Same, pathology pending     Procedure: 1.  LEFT BREAST RFID TAG LOCALIZED ONCOPLASTIC PARTIAL MASTECTOMY  2. LEVEL 1 ADJACENT TISSUE TRANSFER MEASURING 35cm         Surgeon: Swati Pabon MD   Assistant(s):  Mehdi Zarate PA-C  The PA s assistance was medically necessary to provide adequate exposure in the operating field, maintain hemostasis, cutting suture, clamping and ligating bleeding vessels, and visualization of anatomic structures throughout the surgical procedure.      Anesthesia: General    Estimated blood loss:   5 cc     Specimens: ID Type Source Tests Collected by Time Destination   1 : LEFT BREAST LUMPECTOMY, RFID TAG Lumpectomy Breast, Left SURGICAL PATHOLOGY EXAM Swati Pabon MD 5/3/2024  8:01 AM    2 : LEFT BREAST LATERAL MARGIN, INK AT NEW MARGIN Lumpectomy Breast, Left SURGICAL PATHOLOGY EXAM Swati Pabon MD 5/3/2024  8:02 AM         INDICATION:  Please see my clinic note for details    DESCRIPTION OF PROCEDURE: The patient was placed on the table in supine position. General anesthetic was induced. Perioperative antibiotics were given.  The left breast and axilla were prepped and draped in standard sterile fashion.    We used the radiofrequency localizer tag placed in the Breast [facility] as well as the post-tag mammograms to localize the area of interest. Local anesthetic was injected along the marked line for the incision. We made a periareolar incision centered at the 8 o'clock position. We created skin flaps along the anterior mammary fascial plane circumferentially using cautery. This included laterally dividing the retroareolar ductal tissue as the mass was palpable within this tissue. A suture was placed over the highest signal with the probe to guide circumferential dissection. We then carried the dissection down using electrocautery into the  breast tissue and excised the area of interest, including the tag.  The localizer probe was used to guide the dissection. The area of concerning breast tissue was removed in its entirety with some surrounding benign appearing breast tissue.  After the specimen was removed it had a high signal with the localizer probe.  Once the mass was removed, it was oriented with Padilla dyes. A specimen mammogram was obtained and revealed the clip, tag and mass, somewhat closer to the lateral margin. The specimen was then sent to pathology for review.  The wound was then examined for bleeding and hemostasis was achieved using electrocautery.  I did elect to excise a new lateral shave margin and this was inked at the new margin and sent to pathology.     Clips were placed at the 12, 3, 6, and 9 o'clock positions of the lumpectomy cavity as well as posterior.      Local tissue rearrangement was then performed in order to correct the deformity. The lumpectomy defect measured 7cm x 5cm. We then used a vascularized pedicle of surrounding breast tissue to fill the space. This was secured to the surrounding tissue using interrupted 2-0 vicryl sutures.  The skin was closed with a deep dermal 3-0 vicryl and running 4-0 Monocryl subcuticular suture and steri strips.  The patient tolerated the procedure well.  Sponge and instrument counts were correct.           Swati Pabon MD  [facility]  [phone]

## 2024-05-03 NOTE — ANESTHESIA CARE TRANSFER NOTE
Patient: Dilma Crockett    Procedure: Procedure(s):  Left breast tag localized lumpectomy       Diagnosis: Malignant neoplasm of lower-inner quadrant of left breast in female, estrogen receptor positive (H) [C50.312, Z17.0]  Diagnosis Additional Information: No value filed.    Anesthesia Type:   General     Note:    Oropharynx: oropharynx clear of all foreign objects and spontaneously breathing  Level of Consciousness: drowsy  Oxygen Supplementation: face mask  Level of Supplemental Oxygen (L/min / FiO2): 6  Independent Airway: airway patency satisfactory and stable  Dentition: dentition unchanged  Vital Signs Stable: post-procedure vital signs reviewed and stable  Report to RN Given: handoff report given  Patient transferred to: PACU    Handoff Report: Identifed the Patient, Identified the Reponsible Provider, Reviewed the pertinent medical history, Discussed the surgical course, Reviewed Intra-OP anesthesia mangement and issues during anesthesia, Set expectations for post-procedure period and Allowed opportunity for questions and acknowledgement of understanding      Vitals:  Vitals Value Taken Time   /74 05/03/24 0842   Temp     Pulse 53 05/03/24 0844   Resp 16 05/03/24 0844   SpO2 99 % 05/03/24 0844   Vitals shown include unfiled device data.    Electronically Signed By: TONAY Del Angel CRNA  May 3, 2024  8:45 AM

## 2024-05-03 NOTE — ANESTHESIA PROCEDURE NOTES
Airway       Patient location during procedure: OR ([facility] - Operating Room or Procedural Area)  Staff -        CRNA: Helen Overton APRN CRNA       Other Anesthesia Staff: Taryn Ya       Performed By: SRNA and with CRNAs       Procedure performed by resident/fellow/CRNA in presence of a teaching physician.    Consent for Airway        Urgency: elective  Indications and Patient Condition       Indications for airway management: javon-procedural       Induction type:intravenous       Mask difficulty assessment: 1 - vent by mask    Final Airway Details       Final airway type: supraglottic airway    Supraglottic Airway Details        Type: LMA       Brand: Ambu AuraGain       LMA size: 4    Post intubation assessment        Number of attempts at approach: 1       Number of other approaches attempted: 0       Secured with: tape       Ease of procedure: easy       Dentition: Intact and Unchanged

## 2024-05-03 NOTE — ANESTHESIA PREPROCEDURE EVALUATION
Anesthesia Pre-Procedure Evaluation    Patient: Dilma Crockett   MRN: [id number] : 1955        Procedure : Procedure(s):  Left breast tag localized lumpectomy          Past Medical History:   Diagnosis Date    Gall stone     Hyperlipidemia     Invasive ductal carcinoma of breast, left (H)     Kidney stone     Left knee pain     Motion sickness     Osteoporosis     Skin cancer       Past Surgical History:   Procedure Laterality Date    ABDOMEN SURGERY          EXTRACORPOREAL SHOCK WAVE LITHOTRIPSY (ESWL) Bilateral 2014    Procedure: EXTRACORPOREAL SHOCK WAVE LITHOTRIPSY (ESWL);  Surgeon: Donato Baldwin MD;  Location: [facility] OR    GYN SURGERY      Hysterectomy    MYOMECTOMY      SOFT TISSUE SURGERY      Mohs procedure      Allergies   Allergen Reactions    Sulfa Antibiotics Rash      Social History     Tobacco Use    Smoking status: Never    Smokeless tobacco: Never   Substance Use Topics    Alcohol use: Yes     Comment: occ, 2-4x/ year      Wt Readings from Last 1 Encounters:   24 56.2 kg (124 lb)        Anesthesia Evaluation   Pt has had prior anesthetic.     History of anesthetic complications  - motion sickness.      ROS/MED HX  ENT/Pulmonary:    (-) tobacco use, asthma and recent URI   Neurologic:    (-) no seizures, no CVA and no TIA   Cardiovascular:     (+) Dyslipidemia - -   -  - -                                   (-) arrhythmias   METS/Exercise Tolerance: >4 METS    Hematologic:    (-) history of blood clots   Musculoskeletal:       GI/Hepatic:    (-) GERD and liver disease   Renal/Genitourinary:    (-) renal disease   Endo:    (-) Type II DM and obesity   Psychiatric/Substance Use:       Infectious Disease:    (-) Recent Fever   Malignancy:   (+) Malignancy, History of Breast.    Other:            Physical Exam    Airway        Mallampati: II   TM distance: < 3 FB   Neck ROM: full   Mouth opening: > 3 cm    Respiratory Devices and Support         Dental       (+) Minor  "Abnormalities - some fillings, tiny chips      Cardiovascular          Rhythm and rate: regular and normal     Pulmonary           breath sounds clear to auscultation           OUTSIDE LABS:  CBC:   Lab Results   Component Value Date    WBC 10.2 07/22/2016    WBC 5.9 11/17/2014    HGB 14.7 07/22/2016    HGB 14.1 11/17/2014    HCT 42.9 07/22/2016    HCT 42.2 11/17/2014     07/22/2016     11/17/2014     BMP:   Lab Results   Component Value Date     07/22/2016     11/17/2014    POTASSIUM 3.4 07/22/2016    POTASSIUM 3.6 11/17/2014    CHLORIDE 105 07/22/2016    CHLORIDE 109 11/17/2014    CO2 24 07/22/2016    CO2 26 11/17/2014    BUN 27 07/22/2016    BUN 25 11/17/2014    CR 0.82 07/22/2016    CR 0.99 11/17/2014     (H) 07/22/2016     (H) 11/17/2014     COAGS: No results found for: \"PTT\", \"INR\", \"FIBR\"  POC: No results found for: \"BGM\", \"HCG\", \"HCGS\"  HEPATIC:   Lab Results   Component Value Date    ALBUMIN 3.8 11/17/2014    PROTTOTAL 6.9 11/17/2014    ALT 21 11/17/2014    AST 27 11/17/2014    ALKPHOS 85 11/17/2014    BILITOTAL 0.4 11/17/2014     OTHER:   Lab Results   Component Value Date    LACT 0.8 11/17/2014    ALBERT 9.1 07/22/2016    LIPASE 117 11/17/2014       Anesthesia Plan    ASA Status:  3    NPO Status:  NPO Appropriate    Anesthesia Type: General.     - Airway: LMA   Induction: Intravenous, Propofol.   Maintenance: TIVA.        Consents    Anesthesia Plan(s) and associated risks, benefits, and realistic alternatives discussed. Questions answered and patient/representative(s) expressed understanding.     - Discussed: Risks, Benefits and Alternatives for the PROCEDURE were discussed     - Discussed with:  Patient, Spouse (& adult daughter)            Postoperative Care    Pain management: IV analgesics, Oral pain medications, Multi-modal analgesia.   PONV prophylaxis: Ondansetron (or other 5HT-3), Dexamethasone or Solumedrol     Comments:    Other Comments: Scopolamine in " recovery PRN           Taryn Smith MD    I have reviewed the pertinent notes and labs in the chart from the past 30 days and (re)examined the patient.  Any updates or changes from those notes are reflected in this note.

## 2024-05-03 NOTE — DISCHARGE INSTRUCTIONS
Today you were given 1000 mg of Tylenol at 9:21 a.m. The recommended daily maximum dose is 4000 mg.     [facility]  Discharge Instructions: Post-Operative Breast Surgery    ACTIVITY  Take frequent short walks and increase your activity gradually.    Avoid strenuous physical activity or heavy lifting greater than 15-20 lbs. for 1-2 weeks with arm on the surgery side.  You may climb stairs.  Gentle rotation and stretching of your arms and shoulders will prevent joint stiffness.  You may drive without restrictions when you are not using any prescription pain medication and feel comfortable in a car.  You may return to work/school when you are comfortable without any prescription pain medication.    WOUND CARE  You may remove your ACE wrap/dressing and shower 48 hours after the surgery.  Pat your incisions dry and leave them open to air.  Re-apply dressing (Band-Aids or gauze/tape) as needed for drainage.  You may have steri-strips (looks like white tape) or skin glue on your incisions.  You may peel off the steri-strips 2 weeks after your surgery if they have not peeled off on their own.  If you have skin glue, it will peel up and fall off on its own.  Do not soak your incisions in a tub or pool for 2 weeks.   Do not apply any lotions, creams, or ointments to your incisions.  A ridge under your incisions is normal and will gradually resolve.  Wear a supportive bra for 1-2 weeks, day and night.    DIET  Start with liquids, then gradually resume your regular diet as tolerated.   Drink plenty of liquids to stay hydrated.    PAIN  Expect some tenderness and discomfort at the incision site(s).  Use the prescribed pain medication at your discretion.  Expect gradual resolution of your pain over several days.  You may take ibuprofen with food (unless you have been told not to) or acetaminophen/Tylenol instead of or in addition to your prescribed pain medication.  However, if you are taking  Norco or Percocet, do not take any additional acetaminophen/Tylenol.  Do not drink alcohol or drive while you are taking pain medications.    EXPECTATIONS  Pain medications can cause constipation.  Limit use when possible.  Take an over the counter or prescribed stool softener/stimulant, such as Colace or Senna, 1-2 times a day with plenty of water.  You may take a mild over the counter laxative, such as Miralax or a suppository, as needed.    You may discontinue these medications once you are having regular bowel movements and/or are no longer taking your narcotic pain medication.      RETURN APPOINTMENT  Follow up with your surgeon in 2-4 weeks.  Please call the office at [phone] to schedule your appointment.      CALL OUR OFFICE -[phone] IF YOU HAVE:   Chills or fever above 101 F.  Increased redness, warmth, or drainage at your incisions.  Significant bleeding.  Pain not relieved by your pain medication or rest.  Increasing pain after the first 48 hours.  Any other concerns or questions.             Revised October 2022     Same Day Surgery Discharge Instructions for  Sedation and General Anesthesia     It's not unusual to feel dizzy, light-headed or faint for up to 24 hours after surgery or while taking pain medication.  If you have these symptoms: sit for a few minutes before standing and have someone assist you when you get up to walk or use the bathroom.    You should rest and relax for the next 24 hours. We recommend you make arrangements to have an adult stay with you for at least 24 hours after your discharge.  Avoid hazardous and strenuous activity.    DO NOT DRIVE any vehicle or operate mechanical equipment for 24 hours following the end of your surgery.  Even though you may feel normal, your reactions may be affected by the medication you have received.    Do not drink alcoholic beverages for 24 hours following surgery.     Slowly progress to your regular diet as you feel able. It's not  unusual to feel nauseated and/or vomit after receiving anesthesia.  If you develop these symptoms, drink clear liquids (apple juice, ginger ale, broth, 7-up, etc. ) until you feel better.  If your nausea and vomiting persists for 24 hours, please notify your surgeon.      All narcotic pain medications, along with inactivity and anesthesia, can cause constipation. Drinking plenty of liquids and increasing fiber intake will help.    For any questions of a medical nature, call your surgeon.    Do not make important decisions for 24 hours.    If you had general anesthesia, you may have a sore throat for a couple of days related to the breathing tube used during surgery.  You may use Cepacol lozenges to help with this discomfort.  If it worsens or if you develop a fever, contact your surgeon.     If you feel your pain is not well managed with the pain medications prescribed by your surgeon, please contact your surgeon's office to let them know so they can address your concerns.     **If you have concerns or questions about your procedure,    please contact Dr Pabon at  [phone]**

## 2024-05-07 ENCOUNTER — TELEPHONE (OUTPATIENT)
Dept: SURGERY | Facility: CLINIC | Age: 69
End: 2024-05-07
Payer: COMMERCIAL

## 2024-05-07 ENCOUNTER — PATIENT OUTREACH (OUTPATIENT)
Dept: ONCOLOGY | Facility: CLINIC | Age: 69
End: 2024-05-07
Payer: COMMERCIAL

## 2024-05-07 DIAGNOSIS — Z17.0 MALIGNANT NEOPLASM OF LOWER-INNER QUADRANT OF LEFT BREAST IN FEMALE, ESTROGEN RECEPTOR POSITIVE (H): Primary | ICD-10-CM

## 2024-05-07 DIAGNOSIS — C50.312 MALIGNANT NEOPLASM OF LOWER-INNER QUADRANT OF LEFT BREAST IN FEMALE, ESTROGEN RECEPTOR POSITIVE (H): Primary | ICD-10-CM

## 2024-05-07 LAB
PATH REPORT.COMMENTS IMP SPEC: ABNORMAL
PATH REPORT.COMMENTS IMP SPEC: ABNORMAL
PATH REPORT.COMMENTS IMP SPEC: YES
PATH REPORT.FINAL DX SPEC: ABNORMAL
PATH REPORT.GROSS SPEC: ABNORMAL
PATH REPORT.MICROSCOPIC SPEC OTHER STN: ABNORMAL
PATH REPORT.MICROSCOPIC SPEC OTHER STN: ABNORMAL
PATH REPORT.RELEVANT HX SPEC: ABNORMAL
PATHOLOGY SYNOPTIC REPORT: ABNORMAL
PHOTO IMAGE: ABNORMAL

## 2024-05-07 NOTE — TELEPHONE ENCOUNTER
Medical Oncology referral placed.     Therese Del Toro, RN, BSN, PHN  Breast Care Nurse Coordinator  [facility] [place]  [phone]

## 2024-05-07 NOTE — PROGRESS NOTES
New Patient Oncology Nurse Navigator Note     Referring provider: Dr. Swati Pabon     Referring Clinic/Organization: [facility]     Referred to (specialty:) Medical Oncology     Requested provider (if applicable): Dr. Reed Delaney     Date Referral Received: May 7, 2024     Evaluation for:  C50.312, Z17.0 (ICD-10-CM) - Malignant neoplasm of lower-inner quadrant of left breast in female, estrogen receptor positive (H)     Clinical History (per Nurse review of records provided):      Dilma had bilateral screening mammograms on 3/12/24 and a possible asymmetry was identified in the right breast at the 6 o'clock position, posterior depth.  There is also a possible mass in the left breast at the 9 o'clock position, anterior depth and an asymmetry at 9:00 posterior.    Diagnostic imaging followed on 3/19   Findings:     Mammogram: In the left breast at the 8:00 position, anterior depth there is an irregular, hyperdense mass with internal calcifications and spiculated margins measuring 1.5 cm. The questioned asymmetry in the left breast at 9:00 position at posterior depth dissipates on spot compression.  The questioned asymmetry in the right breast on the CC view, retroareolar plane at posterior depth dissipates on spot compression.     Ultrasound:  Targeted ultrasound evaluation was performed by the technologist and radiologist. In the breast at the 8:00 position, 2 cm from the nipple there is irregular, hypoechoic mass with spiculated margins measuring 1.5 x 1.4 x 1.4 cm. Evaluation left breast at the 8:00 and 9:00 positions, 2 - 6 cm from the nipple otherwise demonstrates only normal breast tissue. Evaluation of the left axilla demonstrates only normal lymph nodes.    3/28/24 - Case: [id number]       LEFT breast, 1.5 cm mass, 8:00, 2.0 cm from nipple, ultrasound guided 14 gauge needle core biopsies:  - Invasive mammary (ductal) carcinoma of no special type:      - Declo grade:  1 (out  of 3)      - Marko score:  4 (out of 9)      - Greatest linear length of tumor:  10 mm      - Multiple calcifications within invasive ductal carcinoma.      - In situ carcinoma:  Not identified      - Lymphovascular invasions:  Not identified      - Estrogen and progesterone receptors:  Estrogen 100%, strong and Progesterone %, strong      - IHC HER2:  Score 0     Patient met with Dr. Pabon and proceeded with surgery.    5/3/24 - Case: [id number]   A. Left breast, RFID tag localized lumpectomy:  -INVASIVE CARCINOMA OF NO SPECIAL TYPE (DUCTAL).  -[place] Grade: 1 (Tubule score = 2, Nuclear score = 2, Mitotic score = 1).  -Size: 17 x 16 x 12 mm  -Lymphatic/Vascular Invasion: Not identified.  -Ductal Carcinoma in situ (DCIS): Present, solid type, nuclear grade 1, without comeonecrosis.  -Lobular Carcinoma in situ (LCIS): Focal, with numerous areas of atypical lobular hyperplasia (ALH).  -Microcalcifications: Present, numerous, primarily within invasive carcinoma.  -Margins: All final margins are negative (see tumor synoptic report for details).  -Blocks containing tumor suitable for further testing: A6, A24.  B. Left breast, new lateral margin, lumpectomy:  -Primarily fatty breast tissue with minimal fibrocystic changes.  -Negative for malignancy.    Records Location: See Bookmarked material     Writer received referral, reviewed for appropriate plan, and referral transferred to New Patient Scheduling for completion.

## 2024-05-07 NOTE — CONFIDENTIAL NOTE
I called Dilma and discussed her pathology results. It revealed a 1.7cm grade 1 IDC with DCIS. Clear margins.  I will have Therese help her schedule with radiation oncology (Dr. Preston) and medical oncology. She has follow up with me in place.     Swati Pabon MD  [facility]  [phone]

## 2024-05-08 ENCOUNTER — MYC MEDICAL ADVICE (OUTPATIENT)
Dept: MAMMOGRAPHY | Facility: CLINIC | Age: 69
End: 2024-05-08
Payer: COMMERCIAL

## 2024-05-16 ENCOUNTER — TRANSFERRED RECORDS (OUTPATIENT)
Dept: HEALTH INFORMATION MANAGEMENT | Facility: CLINIC | Age: 69
End: 2024-05-16
Payer: COMMERCIAL

## 2024-05-20 ENCOUNTER — DOCUMENTATION ONLY (OUTPATIENT)
Dept: OTHER | Facility: CLINIC | Age: 69
End: 2024-05-20
Payer: COMMERCIAL

## 2024-05-21 ENCOUNTER — OFFICE VISIT (OUTPATIENT)
Dept: SURGERY | Facility: CLINIC | Age: 69
End: 2024-05-21
Payer: COMMERCIAL

## 2024-05-21 DIAGNOSIS — C50.912 HORMONE RECEPTOR POSITIVE BREAST CANCER, LEFT (H): ICD-10-CM

## 2024-05-21 DIAGNOSIS — Z09 SURGERY FOLLOW-UP EXAMINATION: Primary | ICD-10-CM

## 2024-05-21 DIAGNOSIS — Z12.31 VISIT FOR SCREENING MAMMOGRAM: ICD-10-CM

## 2024-05-21 PROCEDURE — 99024 POSTOP FOLLOW-UP VISIT: CPT | Performed by: SURGERY

## 2024-05-21 NOTE — PROGRESS NOTES
[facility] Breast Surgery Postoperative Note    S: Dilma reports she is doing well. She has minimal pain/discomfort in her breast.     Breasts: left breast lumpectomy incision is healing well. No erythema or induration.     Pathology: Reviewed and copy given to patient    A/P  Dilma Crockett is recovering from a tag localized left breast oncoplastic partial mastectomy on 5/3/2024.  Her pathology revealed a grade 1 invasive ductal carcinoma measuring 1.7 cm.  There was surrounding DCIS.  All margins were negative.  Oncotype ordered and pending  She is scheduled to meet with Dr. Delaney on 6/5/2024. Plan for left mammogram in 6 months and bilateral in one year. Orders placed.     Thank you for the opportunity to help in her care.    Swati Pabon MD  [facility]  [phone]    Please route or send letter to:  Primary Care Provider (PCP) and Referring Provider

## 2024-05-24 LAB — SPECIMEN STATUS: NORMAL

## 2024-05-31 NOTE — TELEPHONE ENCOUNTER
RECORDS STATUS - BREAST    RECORDS REQUESTED FROM:    Appt Date:     Malignant neoplasm of lower-inner quadrant of left breast in female, estrogen receptor positive (H)    NOTES DETAILS STATUS   OFFICE NOTE from referring provider    Reed Delaney MD      DISCHARGE SUMMARY from hospital complete 5/3/2024 Invasive Ductal Carcinoma of left breast    DISCHARGE REPORT from the ER     OPERATIVE REPORT Complete See breast Biopsy in EPIC   MEDICATION LIST Complete [facility]   CLINICAL TRIAL TREATMENTS TO DATE     LABS     REQUEST BLOCKS FOR ALL BREAST CANCER PTS     PATHOLOGY REPORTS  (Tissue diagnosis, Stage, ER/NM percentage positive and intensity of staining, HER2 IHC, FISH, and all biopsies from breast and any distant metastasis)                 Complete 5/3/2024   A. Left breast, RFID tag localized lumpectomy:  -INVASIVE CARCINOMA    3/28/2024  LEFT breast, 1.5 cm mass, 8:00, 2.0 cm from nipple, ultrasound guided 14 gauge needle core biopsies:  - Invasive mammary (ductal) carcinoma   PATHOLOGY [employer] TRACKING:   TRACKING #:   GENONOMIC TESTING     TYPE:   (Next Generation Sequencing, including Foundation One testing, and Oncotype score) Complete Oncotype 5/16/2024   IMAGING (NEED IMAGES & REPORT)     CT SCANS     MRI     MAMMO Complete 5/3/2024, 4/22/2024, 3/28/2024, more in PACS   ULTRASOUND Complete US Breast 4/22/2024 more in PACS   PET     BONE SCAN     BRAIN MRI     IMAGE DISC [employer] TRACKING    TRACKING # :

## 2024-06-05 ENCOUNTER — ONCOLOGY VISIT (OUTPATIENT)
Dept: ONCOLOGY | Facility: CLINIC | Age: 69
End: 2024-06-05
Attending: SURGERY
Payer: COMMERCIAL

## 2024-06-05 ENCOUNTER — PRE VISIT (OUTPATIENT)
Dept: ONCOLOGY | Facility: CLINIC | Age: 69
End: 2024-06-05
Payer: COMMERCIAL

## 2024-06-05 ENCOUNTER — TRANSFERRED RECORDS (OUTPATIENT)
Dept: HEALTH INFORMATION MANAGEMENT | Facility: CLINIC | Age: 69
End: 2024-06-05

## 2024-06-05 VITALS
OXYGEN SATURATION: 96 % | TEMPERATURE: 98.3 F | DIASTOLIC BLOOD PRESSURE: 79 MMHG | SYSTOLIC BLOOD PRESSURE: 113 MMHG | HEART RATE: 72 BPM | WEIGHT: 123.8 LBS | HEIGHT: 61 IN | RESPIRATION RATE: 16 BRPM | BODY MASS INDEX: 23.37 KG/M2

## 2024-06-05 DIAGNOSIS — Z17.0 MALIGNANT NEOPLASM OF LOWER-INNER QUADRANT OF LEFT BREAST IN FEMALE, ESTROGEN RECEPTOR POSITIVE (H): ICD-10-CM

## 2024-06-05 DIAGNOSIS — C50.312 MALIGNANT NEOPLASM OF LOWER-INNER QUADRANT OF LEFT BREAST IN FEMALE, ESTROGEN RECEPTOR POSITIVE (H): ICD-10-CM

## 2024-06-05 DIAGNOSIS — E55.9 VITAMIN D DEFICIENCY: Primary | ICD-10-CM

## 2024-06-05 DIAGNOSIS — Z79.811 LONG TERM (CURRENT) USE OF AROMATASE INHIBITORS: ICD-10-CM

## 2024-06-05 LAB
ALBUMIN SERPL BCG-MCNC: 4.3 G/DL (ref 3.5–5.2)
ALP SERPL-CCNC: 78 U/L (ref 40–150)
ALT SERPL W P-5'-P-CCNC: 13 U/L (ref 0–50)
ANION GAP SERPL CALCULATED.3IONS-SCNC: 11 MMOL/L (ref 7–15)
AST SERPL W P-5'-P-CCNC: 24 U/L (ref 0–45)
BASOPHILS # BLD AUTO: 0 10E3/UL (ref 0–0.2)
BASOPHILS NFR BLD AUTO: 1 %
BILIRUB SERPL-MCNC: 0.5 MG/DL
BUN SERPL-MCNC: 23.3 MG/DL (ref 8–23)
CALCIUM SERPL-MCNC: 9.6 MG/DL (ref 8.8–10.2)
CHLORIDE SERPL-SCNC: 104 MMOL/L (ref 98–107)
CREAT SERPL-MCNC: 0.89 MG/DL (ref 0.51–0.95)
DEPRECATED HCO3 PLAS-SCNC: 26 MMOL/L (ref 22–29)
EGFRCR SERPLBLD CKD-EPI 2021: 70 ML/MIN/1.73M2
EOSINOPHIL # BLD AUTO: 0.1 10E3/UL (ref 0–0.7)
EOSINOPHIL NFR BLD AUTO: 1 %
ERYTHROCYTE [DISTWIDTH] IN BLOOD BY AUTOMATED COUNT: 12 % (ref 10–15)
GLUCOSE SERPL-MCNC: 90 MG/DL (ref 70–99)
HCT VFR BLD AUTO: 46.4 % (ref 35–47)
HGB BLD-MCNC: 15.3 G/DL (ref 11.7–15.7)
IMM GRANULOCYTES # BLD: 0 10E3/UL
IMM GRANULOCYTES NFR BLD: 0 %
LYMPHOCYTES # BLD AUTO: 1.3 10E3/UL (ref 0.8–5.3)
LYMPHOCYTES NFR BLD AUTO: 19 %
MCH RBC QN AUTO: 31.7 PG (ref 26.5–33)
MCHC RBC AUTO-ENTMCNC: 33 G/DL (ref 31.5–36.5)
MCV RBC AUTO: 96 FL (ref 78–100)
MONOCYTES # BLD AUTO: 0.5 10E3/UL (ref 0–1.3)
MONOCYTES NFR BLD AUTO: 7 %
NEUTROPHILS # BLD AUTO: 5 10E3/UL (ref 1.6–8.3)
NEUTROPHILS NFR BLD AUTO: 72 %
NRBC # BLD AUTO: 0 10E3/UL
NRBC BLD AUTO-RTO: 0 /100
PLATELET # BLD AUTO: 233 10E3/UL (ref 150–450)
POTASSIUM SERPL-SCNC: 5 MMOL/L (ref 3.4–5.3)
PROT SERPL-MCNC: 6.8 G/DL (ref 6.4–8.3)
RBC # BLD AUTO: 4.83 10E6/UL (ref 3.8–5.2)
SODIUM SERPL-SCNC: 141 MMOL/L (ref 135–145)
WBC # BLD AUTO: 6.9 10E3/UL (ref 4–11)

## 2024-06-05 PROCEDURE — 85025 COMPLETE CBC W/AUTO DIFF WBC: CPT | Performed by: INTERNAL MEDICINE

## 2024-06-05 PROCEDURE — 99213 OFFICE O/P EST LOW 20 MIN: CPT | Performed by: INTERNAL MEDICINE

## 2024-06-05 PROCEDURE — 86300 IMMUNOASSAY TUMOR CA 15-3: CPT | Performed by: INTERNAL MEDICINE

## 2024-06-05 PROCEDURE — 36415 COLL VENOUS BLD VENIPUNCTURE: CPT | Performed by: INTERNAL MEDICINE

## 2024-06-05 PROCEDURE — 82378 CARCINOEMBRYONIC ANTIGEN: CPT | Performed by: INTERNAL MEDICINE

## 2024-06-05 PROCEDURE — 82306 VITAMIN D 25 HYDROXY: CPT | Performed by: INTERNAL MEDICINE

## 2024-06-05 PROCEDURE — 80053 COMPREHEN METABOLIC PANEL: CPT | Performed by: INTERNAL MEDICINE

## 2024-06-05 PROCEDURE — 99205 OFFICE O/P NEW HI 60 MIN: CPT | Performed by: INTERNAL MEDICINE

## 2024-06-05 RX ORDER — LETROZOLE 2.5 MG/1
2.5 TABLET, FILM COATED ORAL DAILY
Qty: 90 TABLET | Refills: 3 | Status: SHIPPED | OUTPATIENT
Start: 2024-06-05 | End: 2024-09-27

## 2024-06-05 ASSESSMENT — PAIN SCALES - GENERAL: PAINLEVEL: NO PAIN (0)

## 2024-06-05 NOTE — PATIENT INSTRUCTIONS
Start letrozole after radiation  See me in 4 months  Zometa (bone agent) and letrozole information given  Zometa will be scheduled in 4 mo but till then continue fosamax  Labs today then in 4 mo     Take care    Reed Delaney MD

## 2024-06-05 NOTE — Clinical Note
"6/5/2024      Dilma Crockett  [address] [address]      Dear Colleague,    Thank you for referring your patient, Dilma Crockett, to the [facility]. Please see a copy of my visit note below.    Oncology Rooming Note    June 5, 2024 10:16 AM   Dilma Crockett is a 69 year old female who presents for:    Chief Complaint   Patient presents with    Oncology Clinic Visit     Initial Vitals: There were no vitals taken for this visit. Estimated body mass index is 24.22 kg/m  as calculated from the following:    Height as of 5/3/24: 1.524 m (5').    Weight as of 5/3/24: 56.2 kg (124 lb). There is no height or weight on file to calculate BSA.  Data Unavailable Comment: Data Unavailable   No LMP recorded. Patient has had a hysterectomy.  Allergies reviewed: {ALLERGIES:032240}  Medications reviewed: {MEDICATIONS:594972}    Medications: {REFILLS NEEDED:479199}  Pharmacy name entered into [employer]: [facility] [id number] [place] [facility], [address]    Frailty Screening:   Is the patient here for a new oncology consult visit in cancer care? {Frailty screening Yes/No:068006}      Clinical concerns: *** {PROVIDER NOTIFIED?:602588}      Shari J. Schoenberger, [facility]              Oncology Rooming Note    June 5, 2024 10:30 AM   Dilma Crockett is a 69 year old female who presents for:    Chief Complaint   Patient presents with    Oncology Clinic Visit     Initial Vitals: /79   Pulse 72   Temp 98.3  F (36.8  C) (Oral)   Resp 16   Ht 1.549 m (5' 1\")   Wt 56.2 kg (123 lb 12.8 oz)   SpO2 96%   BMI 23.39 kg/m   Estimated body mass index is 23.39 kg/m  as calculated from the following:    Height as of this encounter: 1.549 m (5' 1\").    Weight as of this encounter: 56.2 kg (123 lb 12.8 oz). Body surface area is 1.56 meters squared.  No Pain (0) Comment: Data Unavailable   No LMP recorded. Patient has had a hysterectomy.  Allergies reviewed: Yes  Medications reviewed: Yes    Medications: " Medication refills not needed today.  Pharmacy name entered into [employer]: [facility] [id number] [place] [address]    Frailty Screening:   Is the patient here for a new oncology consult visit in cancer care? 1. Yes. Over the past month, have you experienced difficulty or required a caregiver to assist with:   1. Balance, walking or general mobility (including any falls)? NO  2. Completion of self-care tasks such as bathing, dressing, toileting, grooming/hygiene?  NO  3. Concentration or memory that affects your daily life?  NO       Clinical concerns: No       Shari J. Schoenberger, [facility]                  Again, thank you for allowing me to participate in the care of your patient.        Sincerely,        Reed Delaney MD

## 2024-06-05 NOTE — PROGRESS NOTES
Oncology Rooming Note    June 5, 2024 10:16 AM   Dilma Crockett is a 69 year old female who presents for:    Chief Complaint   Patient presents with    Oncology Clinic Visit     Initial Vitals: There were no vitals taken for this visit. Estimated body mass index is 24.22 kg/m  as calculated from the following:    Height as of 5/3/24: 1.524 m (5').    Weight as of 5/3/24: 56.2 kg (124 lb). There is no height or weight on file to calculate BSA.  Data Unavailable Comment: Data Unavailable   No LMP recorded. Patient has had a hysterectomy.  Allergies reviewed: Yes  Medications reviewed: No        Shari J. Schoenberger, CMA

## 2024-06-05 NOTE — PROGRESS NOTES
"Oncology Rooming Note    June 5, 2024 10:30 AM   Dilma Crockett is a 69 year old female who presents for:    Chief Complaint   Patient presents with    Oncology Clinic Visit     Initial Vitals: /79   Pulse 72   Temp 98.3  F (36.8  C) (Oral)   Resp 16   Ht 1.549 m (5' 1\")   Wt 56.2 kg (123 lb 12.8 oz)   SpO2 96%   BMI 23.39 kg/m   Estimated body mass index is 23.39 kg/m  as calculated from the following:    Height as of this encounter: 1.549 m (5' 1\").    Weight as of this encounter: 56.2 kg (123 lb 12.8 oz). Body surface area is 1.56 meters squared.  No Pain (0) Comment: Data Unavailable   No LMP recorded. Patient has had a hysterectomy.  Allergies reviewed: Yes  Medications reviewed: Yes    Medications: Medication refills not needed today.  Pharmacy name entered into [employer]: [facility] [id number] [place] [address]    Frailty Screening:   Is the patient here for a new oncology consult visit in cancer care? 1. Yes. Over the past month, have you experienced difficulty or required a caregiver to assist with:   1. Balance, walking or general mobility (including any falls)? NO  2. Completion of self-care tasks such as bathing, dressing, toileting, grooming/hygiene?  NO  3. Concentration or memory that affects your daily life?  NO       Clinical concerns: No       Shari J. Schoenberger, [facility] [address] [facility]              "

## 2024-06-06 PROBLEM — Z79.811 LONG TERM (CURRENT) USE OF AROMATASE INHIBITORS: Status: ACTIVE | Noted: 2024-06-06

## 2024-06-06 LAB
CANCER AG15-3 SERPL-ACNC: 13 U/ML
CEA SERPL-MCNC: 3.6 NG/ML
VIT D+METAB SERPL-MCNC: 70 NG/ML (ref 20–50)

## 2024-06-06 NOTE — PROGRESS NOTES
[facility]    Hematology/Oncology New Patient Note      Today's Date: 06/05/24    Reason for Consult: breast cancer       HISTORY OF PRESENT ILLNESS: Dilma is a very pleasant 69-year-old female with the following oncologic diagnoses  1.  Bilateral screening mammogram 3/12/2024 showed possible asymmetry in the right breast at 6 o'clock position posterior depth  2.  Possible mass in the left breast at 9 o'clock position anterior depth and asymmetry at 9:00 posterior  3.  Diagnostic imaging followed showed in the left breast 3 o'clock position anterior depth irregular hyperdense mass with internal calcifications spiculated margins measuring 1.5 cm question asymmetry in the left breast 9 o'clock position posterior depth dissipates on spot compression.  Question asymmetry in the right breast on the cc view retroareolar plane at posterior depth dissipates on spot compression  4.  3/28/2024 ultrasound in the hypoechoic mass left breast at 8 o'clock position 2 cm from the nipple measuring 1.5 cm showed invasive ductal carcinoma grade 1 ER +100% strong HI +91 to 200% strong HER2/callum IHC 0.  Evaluation of other areas showed normal breast tissue.  Evaluation of left axilla showed normal lymph nodes  5.  Status post lumpectomy on 5/3/2024 showed invasive ductal carcinoma left breast measuring 17 x 16 x 12 mm with no lymphovascular invasion associated DCIS nuclear grade 1 with focal numerous areas of atypical lobular hyperplasia and LCIS margins negative.  No regional lymph nodes submitted.  6.  Meets with radiation oncology today  7.  Bone density scan showed osteoporosis and is currently on Fosamax since February 2024  8.  Has a dog named Cyndee son coming in October  9.  Takes vitamin D3 2000/day and Tums for calcium  10 Oncotype 12      Dilma is doing well after the surgery.      REVIEW OF SYSTEMS:   14 point ROS was reviewed and is negative other than as noted above in HPI.       HOME  MEDICATIONS:  Current Outpatient Medications   Medication Sig Dispense Refill    alendronate (FOSAMAX) 10 MG tablet Take 10 mg by mouth every morning (before breakfast)      Calcium Carbonate Antacid (TUMS PO) Take 1,000 mg by mouth      Cholecalciferol (VITAMIN D-3 PO) Take 5,000 Units by mouth daily      letrozole (FEMARA) 2.5 MG tablet Take 1 tablet (2.5 mg) by mouth daily 90 tablet 3         ALLERGIES:  Allergies   Allergen Reactions    Sulfa Antibiotics Rash         PAST MEDICAL HISTORY:  Past Medical History:   Diagnosis Date    Gall stone     Hyperlipidemia     Invasive ductal carcinoma of breast, left ([facility])     Kidney stone     Left knee pain     Motion sickness     Osteoporosis     Skin cancer          PAST SURGICAL HISTORY:  Past Surgical History:   Procedure Laterality Date    ABDOMEN SURGERY          BIOPSY, BREAST, WITH RADIOFREQUENCY IDENTIFICATION Left 5/3/2024    Procedure: Left breast tag localized lumpectomy;  Surgeon: Swati Pabon MD;  Location: [facility] OR    EXTRACORPOREAL SHOCK WAVE LITHOTRIPSY (ESWL) Bilateral 2014    Procedure: EXTRACORPOREAL SHOCK WAVE LITHOTRIPSY (ESWL);  Surgeon: Donato Baldwin MD;  Location: [facility] OR    GYN SURGERY      Hysterectomy    MYOMECTOMY      SOFT TISSUE SURGERY      Mohs procedure         SOCIAL HISTORY:  Social History     Socioeconomic History    Marital status:      Spouse name: Not on file    Number of children: Not on file    Years of education: Not on file    Highest education level: Not on file   Occupational History    Not on file   Tobacco Use    Smoking status: Never    Smokeless tobacco: Never   Vaping Use    Vaping status: Never Used   Substance and Sexual Activity    Alcohol use: Yes     Comment: occ, 2-4x/ year    Drug use: No    Sexual activity: Not on file   Other Topics Concern    Not on file   Social History Narrative    Not on file     Social Determinants of Health     Financial Resource Strain: Not on file   Food Insecurity:  "Not on file   Transportation Needs: Not on file   Physical Activity: Not on file   Stress: Not on file   Social Connections: Not on file   Interpersonal Safety: Not on file   Housing Stability: Not on file     Works as a .   also works full-time    FAMILY HISTORY:  Family History   Problem Relation Age of Onset    Breast Cancer Maternal Grandmother          PHYSICAL EXAM:  Vital signs:  /79   Pulse 72   Temp 98.3  F (36.8  C) (Oral)   Resp 16   Ht 1.549 m (5' 1\")   Wt 56.2 kg (123 lb 12.8 oz)   SpO2 96%   BMI 23.39 kg/m     ECO  GENERAL/CONSTITUTIONAL: No acute distress.  EYES: Pupils are equal, round, and react to light and accommodation. Extraocular movements intact.  No scleral icterus.  ENT/MOUTH: Neck supple. Oropharynx clear, no mucositis.  LYMPH: No anterior cervical, posterior cervical, supraclavicular, axillary or inguinal adenopathy.   RESPIRATORY: Clear to auscultation bilaterally. No crackles or wheezing.   CARDIOVASCULAR: Regular rate and rhythm without murmurs, gallops, or rubs.  GASTROINTESTINAL: No hepatosplenomegaly, masses, or tenderness. The patient has normal bowel sounds. No guarding.  No distention.  MUSCULOSKELETAL: Warm and well-perfused, no cyanosis, clubbing, or edema.  NEUROLOGIC: Cranial nerves II-XII are intact. Alert, oriented, answers questions appropriately.  INTEGUMENTARY: No rashes or jaundice.  GAIT: Steady, does not use assistive device  Status post left breast lumpectomy healing very well no evidence of recurrence.  Right breast normal      LABS:  CBC RESULTS:   Recent Labs   Lab Test 24  1209   WBC 6.9   RBC 4.83   HGB 15.3   HCT 46.4   MCV 96   MCH 31.7   MCHC 33.0   RDW 12.0          Recent Labs   Lab Test 24  1209 16  2026    139   POTASSIUM 5.0 3.4   CHLORIDE 104 105   CO2 26 24   ANIONGAP 11 10   GLC 90 135*   BUN 23.3* 27   CR 0.89 0.82   ALBERT 9.6 9.1         PATHOLOGY:  As per HPI    IMAGING:  As per " HPI    ASSESSMENT/PLAN:  Dilma is a very pleasant 69-year-old female who has a new diagnosis of stage I ER/LA positive HER2/callum negative breast cancer status postlumpectomy    Discussed the role of endocrine therapy with aromatase inhibitors over tamoxifen showing small benefit.  Side effects of letrozole explained written information given.  She also has osteoporosis my recommendation would be to proceed with Zometa every 6 months for 3 years with antitumor activity and bone benefit.  She can continue Fosamax for the next 4 months but we will schedule her for Zometa when she comes back and sees me in 4 months.  Lab work baseline today    Discussed excellent prognosis of this disease and incurable nature.  Her risk of recurrence is in the range of 8 to 10% with aromatase inhibitor therapy or dropped down to less than 2 to 3% with the addition of Zometa for distant disease      1.  Breast cancer: Proceed with letrozole after radiation side effects explained written information given.  Add co-Q10 for prevention of arthralgias  2.  Osteoporosis continue Fosamax for now.  We will get her bone density results from Dr Rocha's office.  Would recommend Zometa in 4 months.  Side effects explained written information given she would like to proceed  3.  Vitamin D deficiency: Check vitamin D today      Total time spent on day of visit, including review of tests, obtaining/reviewing separately obtained history, ordering medications/tests/procedures, communicating with PCP/consultants, and documenting in electronic medical record: 60 minutes         Reed Delaney MD  Hematology/Oncology  [facility]

## 2024-06-26 ENCOUNTER — TRANSFERRED RECORDS (OUTPATIENT)
Dept: HEALTH INFORMATION MANAGEMENT | Facility: CLINIC | Age: 69
End: 2024-06-26
Payer: COMMERCIAL

## 2024-07-17 ENCOUNTER — VIRTUAL VISIT (OUTPATIENT)
Dept: ONCOLOGY | Facility: CLINIC | Age: 69
End: 2024-07-17
Attending: SURGERY
Payer: COMMERCIAL

## 2024-07-17 DIAGNOSIS — Z85.820 PERSONAL HISTORY OF MALIGNANT MELANOMA: ICD-10-CM

## 2024-07-17 DIAGNOSIS — Z17.0 MALIGNANT NEOPLASM OF LOWER-INNER QUADRANT OF LEFT BREAST IN FEMALE, ESTROGEN RECEPTOR POSITIVE (H): Primary | ICD-10-CM

## 2024-07-17 DIAGNOSIS — C50.312 MALIGNANT NEOPLASM OF LOWER-INNER QUADRANT OF LEFT BREAST IN FEMALE, ESTROGEN RECEPTOR POSITIVE (H): Primary | ICD-10-CM

## 2024-07-17 DIAGNOSIS — Z80.3 FAMILY HISTORY OF MALIGNANT NEOPLASM OF BREAST: ICD-10-CM

## 2024-07-17 PROCEDURE — 96040 HC GENETIC COUNSELING, EACH 30 MINUTES: CPT | Mod: 95 | Performed by: GENETIC COUNSELOR, MS

## 2024-07-17 NOTE — PATIENT INSTRUCTIONS
Assessing Cancer Risk  Cancer is a common diagnosis which impacts many families.  Individuals may develop cancer due to environmental factors (such as exposures and lifestyle), aging, genetic predisposition, or a combination of these factors.      Only about 5-10% of cancers are thought to be due to an inherited cancer susceptibility gene.    These families often have:  Several people with the same or related types of cancer  Cancers diagnosed at a young age (before age 50)  Individuals with more than one primary cancer  Multiple generations of the family affected with cancer    Comprehensive Breast and Gynecologic Cancer Panel  We each inherit two copies of every gene in our bodies: one from our mother, and one from our father. Each gene has a specific job to do.  When a gene has a mistake or  mutation  in it, it does not work like it should.     Some people may be candidates for genetic testing of more than one gene.  For these families, genetic testing using a cancer panel may be offered. These panels will test different genes at once known to increase the risk for breast, ovarian, uterine, and/or other cancers.    This handout will review common hereditary breast and gynecologic cancer syndromes. The genes that will be discussed in this handout are: DEQUAN, BRCA1, BRCA2, BRIP1, CDH1, CHEK2, MLH1, MSH2, MSH6, PMS2, EPCAM, PTEN, PALB2, RAD51C, RAD51D, and TP53.    The purpose of this handout is to serve as a brief summary of the breast and gynecologic cancer risk genes that have published clinical management guidelines for individuals who are found to carry a mutation. Inheriting a mutation does not mean a person will develop cancer, but it does significantly increase their risk above the general population risk.     ______________________________________________________________________________    Hereditary Breast and Ovarian Cancer Syndrome (BRCA1 and BRCA2)  A single mutation in one of the copies of BRCA1 or  BRCA2 increases the risk for breast and ovarian cancer, among others.  The risk for pancreatic cancer and melanoma may also be slightly increased in some families.  The chart below shows the chance that someone with a BRCA mutation would develop cancer in his or her lifetime1,2,3,4.       Lifetime Cancer Risks    General Population BRCA1  BRCA2   Breast  12% >60% >60%   Ovarian  1-2% 39-58% 13-29%   Prostate 12% 7-26% 19-61%   Male Breast 0.1% 0.2-1.2% 1.8-7.1%   Pancreas 1-2% Up to 5% 5-10%     A person s ethnic background is also important to consider, as individuals of Ashkenazi Alevism ancestry have a higher chance of having a BRCA gene mutation.  There are three BRCA mutations that occur more frequently in this population.      Bullock Syndrome (MLH1, MSH2, MSH6, PMS2, and EPCAM)  Currently five genes are known to cause Bullock Syndrome: MLH1, MSH2, MSH6, PMS2, and EPCAM.  A single mutation in one of the Bullock Syndrome genes increases the risk for colon, endometrial, ovarian, and stomach cancers.  Other cancers that occur less commonly in Bullock Syndrome include urinary tract, skin, and brain cancers.  The chart below shows the chance that a person with Bullock syndrome would develop cancer in his or her lifetime5.      Lifetime Cancer Risks    General Population Bullock Syndrome   Colon 5% 10-61%   Endometrial 3% 13-57%   Ovarian 1-2% 1-38%   Stomach <1% 1-9%   *Cancer risk varies depending on Bullock syndrome gene found      Cowden Syndrome (PTEN)  Cowden syndrome is a hereditary condition that increases the risk for breast, thyroid, endometrial, colon, and kidney cancer.  Cowden syndrome is caused by a mutation in the PTEN gene.  A single mutation in one of the copies of PTEN causes Cowden syndrome and increases cancer risk.  The chart below shows the chance that someone with a PTEN mutation would develop cancer in their lifetime6,7.  Other benign features seen in some individuals with Cowden syndrome include benign  skin lesions (facial papules, keratoses, lipomas), learning disability, autism, thyroid nodules, colon polyps, and larger head size.     Lifetime Cancer Risks    General Population Cowden   Breast 12% 40-60%*   Thyroid 1% Up to 38%   Renal 1-2% Up to 35%   Endometrial 3% Up to 28%   Colon 5% Up to 9%   Melanoma 2-3% Up to 6%   *Emerging data suggests the risk for breast cancer could be greater than 60%               Li-Fraumeni Syndrome (TP53)  Li-Fraumeni Syndrome (LFS) is a cancer predisposition syndrome caused by a mutation in the TP53 gene. A single mutation in one of the copies of TP53 increases the risk for multiple cancers. Individuals with LFS are at an increased risk for developing cancer at a young age. The lifetime risk for development of a LFS-associated cancer is 50% by age 30 and 90% by age 60.   Core Cancers: Sarcomas, Breast, Brain, Lung, Leukemias/Lymphomas, Adrenocortical carcinomas  Other Cancers: Gastrointestinal, Thyroid, Skin, Genitourinary       Hereditary Diffuse Gastric Cancer (CDH1)  Currently, one gene is known to cause hereditary diffuse gastric cancer (HDGC): CDH1.  Individuals with HDGC are at increased risk for diffuse gastric cancer and lobular breast cancer. Of people diagnosed with HDGC, 30-50% have a mutation in the CDH1 gene.  This suggests there are likely other genes that may cause HDGC that have not been identified yet.      Lifetime Cancer Risks    General Population HDGC   Diffuse Gastric  <1% ~80%   Breast 12% 41-60%       Additional Genes    DEQUAN  DEQUAN is a moderate-risk breast cancer gene. Women who have a mutation in DEQUAN can have between a 2-4 fold increased risk for breast cancer compared to the general population8. DEQUAN mutations have also been associated with increased risk for pancreatic cancer between 5-10%9. Individuals who inherit two DEQUAN mutations have a condition called ataxia-telangiectasia (AT).  This rare autosomal recessive condition affects the nervous system  and immune system, and is associated with progressive cerebellar ataxia beginning in childhood. Individuals with ataxia-telangiectasia often have a weakened immune system and have an increased risk for childhood cancers.    PALB2  Mutations in PALB2 have been shown to increase the risk of breast cancer up to 41-60% in some families; where individuals fall within this risk range is dependent upon family [id number]. PALB2 mutations have also been associated with increased risk for pancreatic cancer between 5-10%.  Individuals who inherit two PALB2 mutations--one from their mother and one from their father--have a condition called Fanconi Anemia.  This rare autosomal recessive condition is associated with short stature, developmental delay, bone marrow failure, and increased risk for childhood cancers.    CHEK2   CHEK2 is a moderate-risk breast cancer gene.  Women who have a mutation in CHEK2 have around a 2-4 fold increased risk for breast cancer compared to the general population, and this risk may be higher depending upon family history.11,12,13 The risk of colon cancer may be twice as high as the general population risk of colon cancer of 5%. Mutations in CHEK2 have also been shown to increase the risk of other cancers, including prostate, however these cancer risks are currently not well understood.    BRIP1, RAD51C and RAD51D  Mutations in RAD51C and RAD51D have been shown to increase the risk of ovarian cancer and breast cancer 14,. Mutations in BRIP1 have been shown to increase the risk of ovarian cancer and possibly female breast cancer 15 .       Lifetime Cancer Risk    General Population        BRIP1   RAD51C  RAD51D   Breast 12% Not well defined 20-40% 20-40%   Ovarian 1-2% 5-15% 10-15% 10-20%     ______________________________________________________________  Inheritance  All of the cancer syndromes reviewed above are inherited in an autosomal dominant pattern.  This means that if a parent has a mutation,  each of their children will have a 50% chance of inheriting that same mutation. Therefore, each child --male or female-- would have a 50% chance of being at increased risk for developing cancer.    Image obtained from Genetics Home Reference, 2013     Mutations in some genes can occur de herminia, which means that a person s mutation occurred for the first time in them and was not inherited from a parent.  Now that they have the mutation, however, it can be passed on to future generations.    Genetic Testing  Genetic testing involves a blood test and will look for any harmful mutations that are associated with increased cancer risk.  If possible, it is recommended that the person(s) who has had cancer be tested before other family members.  That person will give us the most useful information about whether or not a specific gene is associated with the cancer in the family.    Results  There are three possible results of genetic testing:  Positive--a harmful mutation was identified in one or more of the genes  Negative--no mutations were identified in any of the genes tested  Variant of unknown significance--a variation in one of the genes was identified, but it is unclear how this impacts cancer risk in the family    Advantages and Disadvantages   There are advantages and disadvantages to genetic testing.    Advantages  May clarify your cancer risk  Can help you make medical decisions  May explain the cancers in your family  May give useful information to your family members (if you share your results)    Disadvantages  Possible negative emotional impact of learning about inherited cancer risk  Uncertainty in interpreting a negative test result in some situations  Possible genetic discrimination concerns (see below)    Genetic Information Nondiscrimination Act (BRITTANY)  The Genetic Information Nondiscrimination Act of 2008 (BRITTANY) is a federal law that protects individuals from health insurance or employment discrimination  based on a genetic test result alone (with some exceptions, including employers with fewer than 15 employees, and ).  Although rare, BRITTANY  does not cover discrimination protections in terms of life insurance, long term care, or disability insurances.  Visit the National Human Informatics In Context Research [place] website to learn more.    Reducing Cancer Risk  All of the genes described in this handout have nationally recognized cancer screening guidelines that would be recommended for individuals who test positive.  In addition to increased cancer screening, surgeries may be offered or recommended to reduce cancer risk.  Recommendations are based upon an individual s genetic test result as well as their personal and family history of cancer.    Questions to Think About Regarding Genetic Testing:  What effect will the test result have on me and my relationship with my family members if I have an inherited gene mutation?  If I don t have a gene mutation?  Should I share my test results, and how will my family react to this news, which may also affect them?  Are my children ready to learn new information that may one day affect their own health?    Hereditary Cancer Resources    FORCE: Facing Our Risk of Cancer Empowered facingourrisk.org   Bright Pink bebrightpink.org   Li-Fraumeni Syndrome Association lfsassociation.org   PTEN World PTENworld.com   No stomach for cancer, Inc. nostomachforcancer.org   Stomach cancer relief network Scrnet.org   Collaborative Group of the Americas on Inherited Colorectal Cancer (CGA) cgaicc.com    Cancer Care cancercare.org   American Cancer Society (ACS) cancer.org   National Cancer [place] (NCI) cancer.gov     Please call us if you have any questions or concerns.   Cancer Risk Management Program [phone]-C[facility]ER ([phone])  Jimmy Almazan, [place] [facility]  [phone]  Kaitlin Norton, MS, [facility] [phone]  Kira Combs, MS, [facility]  [phone]  Rita Martins, MS, [facility]  [phone]  Nancy Potts,  MS, [facility] [place]  [phone]  Yolanda Haq, MS, [facility] [place] [phone]  Saige Vázquez, MS, [facility] [place] [phone]    References  Chapin Hess PDP, Eunice S, Amada LOGAN, Blanka JE, Carolyn JL, Mauri N, Tracee H, Yanely O, Maria Isabel A, Pasini B, Radiramona P, Manlola S, Marci DM, Santamaria N, Bubba E, Jc H, Rincon E, Lavonne J, Gronbilly J, Елена B, Tulinius H, Thorlacius S, Eerola H, Nevanlinna H, Yohannes K, Cristina OP. Average risks of breast and ovarian cancer associated with BRCA1 or BRCA2 mutations detected in case series unselected for family history: a combined analysis of 222 studies. Am J Hum [place]. 2003;72:1117-30.  Cony N, Alba M, Genet G.  BRCA1 and BRCA2 Hereditary Breast and Ovarian Cancer. Gene Reviews online. 2013.  Rick YC, Theodore S, Romie G, Simon S. Breast cancer risk among male BRCA1 and BRCA2 mutation carriers. J Natl Cancer Inst. 2007;99:1811-4.  Vince PATTERSON, Eliel I, Rl J, Prasanth E, Addie ER, Tiffany F. Risk of breast cancer in male BRCA2 carriers. J Med [place]. 2010;47:710-1.  National Comprehensive Cancer Network. Clinical practice guidelines in oncology, colorectal cancer screening. Available online (registration required). 2015.  Cristi MH, Gwendolyn J, Aminah J, Ree BLAKE, Per MS, Eng C. Lifetime cancer risks in individuals with germline PTEN mutations. Clin Cancer Res. 2012;18:400-7.  Rickey R. Cowden Syndrome: A Critical Review of the Clinical Literature. J [place] . 2009:18:13-27.  Christiano SILVERIO, Major GOEL, Brandon S, Chelly P, Christelle T, Anjelica M, Cody B, Adriano H, Jonathon R, Barbie K, Awais L, Vince PATTERSON, Marci GOEL, Marco DF, César MR, The Breast Cancer Susceptibility Collaboration ([place]) & Kristen CONNORS. DEQUAN mutations that cause ataxia-telangiectasia are breast cancer susceptibility alleles. Nature Genetics. 2006;38:873-875  David N , Tomasz Y, Jaqueline J, Joseph L, Clemencia VASQUES , Inderjit ML, Dulce S, Alex AG, Chanel S, Edgar ML, Latrice J , Gregory R, Terri OQUENDO, Mishel  JR, Richar VE, Karla M, Vosusanstein B, Josiah N, Luann RH, Mega KW, and Rodney AP. DEQUAN mutations in patients with hereditary pancreatic cancer. Cancer Discover. 2012;2:41-46  Jordan MOSCOSO., et al. Breast-Cancer Risk in Families with Mutations in PALB2. NEJM. 2014; 371(6):497-506.  CHEK2 Breast Cancer Case-Control Consortium. CHEK2*1100delC and susceptibility to breast cancer: A collaborative analysis involving 10,860 breast cancer cases and 9,065 controls from 10 studies. Am J Hum [place], 74 (2004), pp. 3902-8180  Thomas T, Irwin S, Yeni K, et al. Spectrum of Mutations in BRCA1, BRCA2, CHEK2, and TP53 in Families at High Risk of Breast Cancer. HANNAH. 2006;295(12):8984-7242.   Kimberlee C, Coty D, Kenia SILVERIO, et al. Risk of breast cancer in women with a CHEK2 mutation with and without a family history of breast cancer. J Clin Oncol. 2011;29:3827-2393.  Song H, Shlomos E, Ramus SJ, et al. Contribution of germline mutations in the RAD51B, RAD51C, and RAD51D genes to ovarian cancer in the population. J Clin Oncol. 2015;33(26):5257-9330. Doi:10.1200/JCO.2015.61.2408.  Nicole T, Neela DF, Lena P, et al. Mutations in BRIP1 confer high risk of ovarian cancer. Malia [place]. 2011;43(11):9617-2638. doi:10.1038/ng.955.

## 2024-07-17 NOTE — LETTER
7/17/2024      Dilma Crockett  [address] [address]      Dear Colleague,    Thank you for referring your patient, Dilma Crockett, to the [facility]. Please see a copy of my visit note below.    Virtual Visit Details    Type of service:  Telephone Visit   Phone call duration: 57 minutes   Originating Location (pt. Location): Home  Distant Location (provider location):  Off-site    7/17/2024    Referring Provider: Swati Pabon MD     Presenting Information:   I spoke with Dilma Crockett over the phone today for genetic counseling to discuss her personal and family history of cancer. This appointment was conducted virtually due to COVID-19 precautions. We talked today to review this history, cancer screening recommendations, and available genetic testing options.    Personal History:  Dilma is a 69 year old female. She was recently diagnosed with a left breast cancer at age 69 in March 2024 (IDC, ER positive, MO positive, HER2 negative). Treatment included lumpectomy, radiation, and she is now taking letrozole.     She also reports a history of melanoma on her face at age 43 in 1998, treated with Mohs, as well as two basal cell carcinomas on her back in the late 1990s, treated with excision (records not available for review today). She reports that her last skin exam was in May and she will be having this annually.    Dilma reports that she had a hysterectomy and at least one ovary removed in 1998. She is not sure if the other ovary was also removed at that time or if she still has one ovary in place. She reports that she has not used hormone replacement therapy. She has used oral contraceptives. Dilma has not had a colonoscopy, but reports that she has her first one scheduled in August. Dilma reported no history of tobacco use and alcohol use of about 1 drink per month.    Family History: (Please see scanned pedigree for detailed family history  information)  Maternal:  Her mother passed away at age 88 with no known history of cancer.   Her grandmother was diagnosed with breast cancer at age 82. She had a mastectomy, although Dilma is not sure if this was a unilateral mastectomy or bilateral mastectomy. This cancer was possibly metastatic to her brain, although Dilma is not certain of this. She passed away around age 84.   Paternal:  Her father passed away at age 90 with no known history of cancer.   Her father was an only child.     Dilma is not aware of any genetic testing for inherited cancer risk in any of her family members.     Her maternal ethnicity is Bahamian. Her paternal ethnicity is English, Somali, Palestinian. There is no known Ashkenazi Orthodoxy ancestry on either side of her family.     Discussion:  Dilma's personal and family history of cancer is suggestive of a hereditary cancer syndrome.  We reviewed the features of sporadic, familial, and hereditary cancers. In looking at Dilma's family history, it is possible that a cancer susceptibility gene is present due to her recent diagnosis of breast cancer and family history of breast cancer, as well as her personal history of melanoma.  We discussed the natural history and genetics of hereditary cancer. Based on her personal and family history, we discussed genes in which mutations are associated with an increased risk for breast cancer, such as the BRCA1 and BRCA2 genes. Mutations in these genes cause a condition known as Hereditary Breast and Ovarian Cancer syndrome (HBOC). Women with a mutation in either of these genes are at increased risk for breast and ovarian cancer. There is also an increased risk for a second primary breast cancer. Men with a mutation in either of these genes are at increased risk for breast and prostate cancer. Both women and men may also be at increased risk for pancreatic cancer and melanoma. A detailed handout regarding these genes and other genes in which mutations are  associated with an increased risk for breast cancer will be provided to Dilma via [employer] and can be found in the after visit summary. Topics included: inheritance pattern, cancer risks, cancer screening recommendations, and also risks, benefits and limitations of testing.    Based on her personal and family history, Dilma does not meet current National Comprehensive Cancer Network (NCCN) criteria for genetic testing of BRCA1/2. However, according to the American Society of Breast Surgeons Consensus Guideline on Genetic Testing for Hereditary Breast Cancer, genetic testing should be available to all patients who have been diagnosed with breast cancer. The guidelines state that testing should include BRCA1, BRCA2, and PALB2, and that additional testing may be warranted based on personal and/or family history.  We discussed that there are additional genes that could cause increased risk for breast, melanoma, and other cancers. As many of these genes present with overlapping features in a family and accurate cancer risk cannot always be established based upon the pedigree analysis alone, it would be reasonable for Dilma to consider panel genetic testing to analyze multiple genes at once.  We reviewed genetic testing options for Dilma based on her personal and family history: a panel of genes associated with an increased risk for certain cancers, or larger panel options to include genes associated with increased risk for multiple different cancer types. She expressed an interest in more broad testing and opted for a Custom Panel (a combination of the Common Hereditary Cancers Panel + the Hereditary Skin Cancer Panel, including preliminary-evidence genes for skin cancer).  Genetic testing is available for 48 genes associated with cancers of the breast, ovary, uterus, prostate and gastrointestinal system: [facility] Common Hereditary Cancers panel (APC, DEQUAN, AXIN2, BAP1, BARD1, BMPR1A, BRCA1, BRCA2, BRIP1, CDH1, CDK4,  CDKN2A, CHEK2, CTNNA1, DICER1, EPCAM, FH, GREM1, HOXB13, KIT, MBD4, MEN1, MLH1, MSH2, MSH3, MSH6, MUTYH, NF1, NTHL1, PALB2, PDGFRA, PMS2, POLD1, POLE, PTEN, RAD51C, RAD51D, SDHA, SDHB, SDHC, SDHD, SMAD4, SMARCA4, STK11, TP53, TSC1, TSC2, VHL).    We discussed that many of these genes are associated with specific hereditary cancer syndromes and published management guidelines: Hereditary Breast and Ovarian Cancer syndrome (BRCA1, BRCA2), Bullock syndrome (MLH1, MSH2, MSH6, PMS2, EPCAM), Familial Adenomatous Polyposis (APC), Hereditary Diffuse Gastric Cancer (CDH1), Familial Atypical Multiple Mole Melanoma syndrome (CDK4, CDKN2A), Multiple Endocrine Neoplasia type 1 (MEN1), Juvenile Polyposis syndrome (BMPR1A, SMAD4), Cowden syndrome (PTEN), Li Fraumeni syndrome (TP53), Hereditary Paraganglioma and Pheochromocytoma syndrome (SDHA, SDHB, SDHC, SDHD), Peutz-Jeghers syndrome (STK11), MUTYH Associated Polyposis (MUTYH), Tuberous sclerosis complex (TSC1, TSC2), Von Hippel-Lindau disease (VHL), and Neurofibromatosis type 1 (NF1).   The DEQUAN, AXIN2, BAP1, BRIP1, CHEK2, FH, GREM1, MBD4, MSH3, NTHL1, PALB2, POLD1, POLE, RAD51C, and RAD51D genes are associated with increased cancer risk and have published management guidelines for certain cancers.   The remaining genes (BARD1, CTNNA1, DICER1, HOXB13, KIT, PDGFRA, and SMARCA4) are associated with increased cancer risk and may allow us to make medical recommendations when mutations are identified.   Due to COVID-19 precautions consent was obtained over the phone/video today. Genetic testing via a Custom Panel (a combination of the Common Hereditary Cancers Panel + the Hereditary Skin Cancer Panel, including preliminary-evidence genes for skin cancer) will be sent to [facility]. Dilma opted to schedule a blood draw for testing. Turnaround time from date when sample is received at the lab: approximately 3-4 weeks.  Medical Management: For Dilma, we reviewed that the  information from genetic testing may determine:  additional cancer screening for which Dilma may qualify (i.e. mammogram and breast MRI, more frequent colonoscopies, more frequent dermatologic exams, etc.),  options for risk reducing surgeries Dilma could consider (i.e. bilateral mastectomy, surgery to remove her ovary, etc.),    and targeted chemotherapies if she were to develop certain cancers in the future (i.e. immunotherapy for individuals with Bullock syndrome, PARP inhibitors, etc.).   These recommendations will be discussed in detail once genetic testing is completed.     Plan:  1) Today Dilma elected to proceed with genetic testing via a Custom Panel (a combination of the Common Hereditary Cancers Panel + the Hereditary Skin Cancer Panel, including preliminary-evidence genes for skin cancer) offered by [employer].  2) This information should be available in 4-5 weeks.  3) Dilma will be scheduled for a virtual visit (phone or video) to discuss the results.    Nancy Potts MS, [facility]  Licensed, Certified Genetic Counselor  Office: [phone]  Email: [email or web address]         Again, thank you for allowing me to participate in the care of your patient.        Sincerely,        Nancy Potts GC

## 2024-07-17 NOTE — NURSING NOTE
Current patient location: [address] [place] [id number]    Is the patient currently in the state of [place]? YES    Visit mode:TELEPHONE    If the visit is dropped, the patient can be reconnected by: TELEPHONE VISIT: Phone number:   Telephone Information:   Mobile [phone]       Will anyone else be joining the visit? NO  (If patient encounters technical issues they should call [phone] :[phone])    How would you like to obtain your AVS? MyChart    Are changes needed to the allergy or medication list? N/A    Are refills needed on medications prescribed by this physician? NO    Reason for visit:  CONSULT    Radha ALFRED

## 2024-07-17 NOTE — PROGRESS NOTES
Virtual Visit Details    Type of service:  Telephone Visit   Phone call duration: 57 minutes   Originating Location (pt. Location): Home  Distant Location (provider location):  Off-site    7/17/2024    Referring Provider: Swati Pabon MD     Presenting Information:   I spoke with Dilma Crockett over the phone today for genetic counseling to discuss her personal and family history of cancer. This appointment was conducted virtually due to COVID-19 precautions. We talked today to review this history, cancer screening recommendations, and available genetic testing options.    Personal History:  Dilma is a 69 year old female. She was recently diagnosed with a left breast cancer at age 69 in March 2024 (IDC, ER positive, NC positive, HER2 negative). Treatment included lumpectomy, radiation, and she is now taking letrozole.     She also reports a history of melanoma on her face at age 43 in 1998, treated with Mohs, as well as two basal cell carcinomas on her back in the late 1990s, treated with excision (records not available for review today). She reports that her last skin exam was in May and she will be having this annually.    Dilma reports that she had a hysterectomy and at least one ovary removed in 1998. She is not sure if the other ovary was also removed at that time or if she still has one ovary in place. She reports that she has not used hormone replacement therapy. She has used oral contraceptives. Dilma has not had a colonoscopy, but reports that she has her first one scheduled in August. Dilma reported no history of tobacco use and alcohol use of about 1 drink per month.    Family History: (Please see scanned pedigree for detailed family history information)  Maternal:  Her mother passed away at age 88 with no known history of cancer.   Her grandmother was diagnosed with breast cancer at age 82. She had a mastectomy, although Dilma is not sure if this was a unilateral mastectomy or bilateral mastectomy.  This cancer was possibly metastatic to her brain, although Dilma is not certain of this. She passed away around age 84.   Paternal:  Her father passed away at age 90 with no known history of cancer.   Her father was an only child.     Dilma is not aware of any genetic testing for inherited cancer risk in any of her family members.     Her maternal ethnicity is Kosovan. Her paternal ethnicity is English, [place], Burmese. There is no known Ashkenazi Religion ancestry on either side of her family.     Discussion:  Dilma's personal and family history of cancer is suggestive of a hereditary cancer syndrome.  We reviewed the features of sporadic, familial, and hereditary cancers. In looking at Dilma's family history, it is possible that a cancer susceptibility gene is present due to her recent diagnosis of breast cancer and family history of breast cancer, as well as her personal history of melanoma.  We discussed the natural history and genetics of hereditary cancer. Based on her personal and family history, we discussed genes in which mutations are associated with an increased risk for breast cancer, such as the BRCA1 and BRCA2 genes. Mutations in these genes cause a condition known as Hereditary Breast and Ovarian Cancer syndrome (HBOC). Women with a mutation in either of these genes are at increased risk for breast and ovarian cancer. There is also an increased risk for a second primary breast cancer. Men with a mutation in either of these genes are at increased risk for breast and prostate cancer. Both women and men may also be at increased risk for pancreatic cancer and melanoma. A detailed handout regarding these genes and other genes in which mutations are associated with an increased risk for breast cancer will be provided to Dilma via [employer] and can be found in the after visit summary. Topics included: inheritance pattern, cancer risks, cancer screening recommendations, and also risks, benefits and limitations of  testing.    Based on her personal and family history, Dilma does not meet current National Comprehensive Cancer Network (NCCN) criteria for genetic testing of BRCA1/2. However, according to the American Society of Breast Surgeons Consensus Guideline on Genetic Testing for Hereditary Breast Cancer, genetic testing should be available to all patients who have been diagnosed with breast cancer. The guidelines state that testing should include BRCA1, BRCA2, and PALB2, and that additional testing may be warranted based on personal and/or family history.  We discussed that there are additional genes that could cause increased risk for breast, melanoma, and other cancers. As many of these genes present with overlapping features in a family and accurate cancer risk cannot always be established based upon the pedigree analysis alone, it would be reasonable for Dilma to consider panel genetic testing to analyze multiple genes at once.  We reviewed genetic testing options for Dilma based on her personal and family history: a panel of genes associated with an increased risk for certain cancers, or larger panel options to include genes associated with increased risk for multiple different cancer types. She expressed an interest in more broad testing and opted for a Custom Panel (a combination of the Common Hereditary Cancers Panel + the Hereditary Skin Cancer Panel, including preliminary-evidence genes for skin cancer).  Genetic testing is available for 48 genes associated with cancers of the breast, ovary, uterus, prostate and gastrointestinal system: [facility] Common Hereditary Cancers panel (APC, DEQUAN, AXIN2, BAP1, BARD1, BMPR1A, BRCA1, BRCA2, BRIP1, CDH1, CDK4, CDKN2A, CHEK2, CTNNA1, DICER1, EPCAM, FH, GREM1, HOXB13, KIT, MBD4, MEN1, MLH1, MSH2, MSH3, MSH6, MUTYH, NF1, NTHL1, PALB2, PDGFRA, PMS2, POLD1, POLE, PTEN, RAD51C, RAD51D, SDHA, SDHB, SDHC, SDHD, SMAD4, SMARCA4, STK11, TP53, TSC1, TSC2, VHL).    We discussed that many  of these genes are associated with specific hereditary cancer syndromes and published management guidelines: Hereditary Breast and Ovarian Cancer syndrome (BRCA1, BRCA2), Bullock syndrome (MLH1, MSH2, MSH6, PMS2, EPCAM), Familial Adenomatous Polyposis (APC), Hereditary Diffuse Gastric Cancer (CDH1), Familial Atypical Multiple Mole Melanoma syndrome (CDK4, CDKN2A), Multiple Endocrine Neoplasia type 1 (MEN1), Juvenile Polyposis syndrome (BMPR1A, SMAD4), Cowden syndrome (PTEN), Li Fraumeni syndrome (TP53), Hereditary Paraganglioma and Pheochromocytoma syndrome (SDHA, SDHB, SDHC, SDHD), Peutz-Jeghers syndrome (STK11), MUTYH Associated Polyposis (MUTYH), Tuberous sclerosis complex (TSC1, TSC2), Von Hippel-Lindau disease (VHL), and Neurofibromatosis type 1 (NF1).   The DEQUAN, AXIN2, BAP1, BRIP1, CHEK2, FH, GREM1, MBD4, MSH3, NTHL1, PALB2, POLD1, POLE, RAD51C, and RAD51D genes are associated with increased cancer risk and have published management guidelines for certain cancers.   The remaining genes (BARD1, CTNNA1, DICER1, HOXB13, KIT, PDGFRA, and SMARCA4) are associated with increased cancer risk and may allow us to make medical recommendations when mutations are identified.   Due to COVID-19 precautions consent was obtained over the phone/video today. Genetic testing via a Custom Panel (a combination of the Common Hereditary Cancers Panel + the Hereditary Skin Cancer Panel, including preliminary-evidence genes for skin cancer) [facility]. Dilma opted to schedule a blood draw for testing. Turnaround time from date when sample is received at the lab: approximately 3-4 weeks.  Medical Management: For Dilma, we reviewed that the information from genetic testing may determine:  additional cancer screening for which Dilma may qualify (i.e. mammogram and breast MRI, more frequent colonoscopies, more frequent dermatologic exams, etc.),  options for risk reducing surgeries Dilma could consider (i.e.  bilateral mastectomy, surgery to remove her ovary, etc.),    and targeted chemotherapies if she were to develop certain cancers in the future (i.e. immunotherapy for individuals with Bullock syndrome, PARP inhibitors, etc.).   These recommendations will be discussed in detail once genetic testing is completed.     Plan:  1) Today Dilma elected to proceed with genetic testing via a Custom Panel (a combination of the Common Hereditary Cancers Panel + the Hereditary Skin Cancer Panel, including preliminary-evidence genes for skin cancer) offered by [email or web address]  2) This information should be available in 4-5 weeks.  3) Dilma will be scheduled for a virtual visit (phone or video) to discuss the results.    Nancy Potts MS, [facility]  Licensed, Certified Genetic Counselor  Office: [phone]  Email: [email or web address]

## 2024-07-26 ENCOUNTER — LAB (OUTPATIENT)
Dept: LAB | Facility: CLINIC | Age: 69
End: 2024-07-26
Payer: COMMERCIAL

## 2024-07-26 DIAGNOSIS — C50.312 MALIGNANT NEOPLASM OF LOWER-INNER QUADRANT OF LEFT BREAST IN FEMALE, ESTROGEN RECEPTOR POSITIVE (H): ICD-10-CM

## 2024-07-26 DIAGNOSIS — Z17.0 MALIGNANT NEOPLASM OF LOWER-INNER QUADRANT OF LEFT BREAST IN FEMALE, ESTROGEN RECEPTOR POSITIVE (H): ICD-10-CM

## 2024-07-26 DIAGNOSIS — Z80.3 FAMILY HISTORY OF MALIGNANT NEOPLASM OF BREAST: ICD-10-CM

## 2024-07-26 DIAGNOSIS — Z85.820 PERSONAL HISTORY OF MALIGNANT MELANOMA: ICD-10-CM

## 2024-07-26 PROCEDURE — 36415 COLL VENOUS BLD VENIPUNCTURE: CPT

## 2024-07-26 PROCEDURE — 99000 SPECIMEN HANDLING OFFICE-LAB: CPT

## 2024-08-05 LAB — SCANNED LAB RESULT: NORMAL

## 2024-08-22 ENCOUNTER — VIRTUAL VISIT (OUTPATIENT)
Dept: ONCOLOGY | Facility: CLINIC | Age: 69
End: 2024-08-22
Attending: GENETIC COUNSELOR, MS
Payer: COMMERCIAL

## 2024-08-22 DIAGNOSIS — Z17.0 MALIGNANT NEOPLASM OF LOWER-INNER QUADRANT OF LEFT BREAST IN FEMALE, ESTROGEN RECEPTOR POSITIVE (H): Primary | ICD-10-CM

## 2024-08-22 DIAGNOSIS — C50.312 MALIGNANT NEOPLASM OF LOWER-INNER QUADRANT OF LEFT BREAST IN FEMALE, ESTROGEN RECEPTOR POSITIVE (H): Primary | ICD-10-CM

## 2024-08-22 DIAGNOSIS — Z80.3 FAMILY HISTORY OF MALIGNANT NEOPLASM OF BREAST: ICD-10-CM

## 2024-08-22 DIAGNOSIS — Z85.820 PERSONAL HISTORY OF MALIGNANT MELANOMA: ICD-10-CM

## 2024-08-22 PROCEDURE — 999N000069 HC STATISTIC GENETIC COUNSELING, < 16 MIN: Mod: TEL,95 | Performed by: GENETIC COUNSELOR, MS

## 2024-08-22 NOTE — PROGRESS NOTES
"Virtual Visit Details    Type of service:  Telephone Visit   Phone call duration: 12 minutes   Originating Location (pt. Location): Home  Distant Location (provider location):  Off-site    8/22/2024    Referring Provider: Swati Pabon MD     Presenting Information:  I spoke to Dilma over the phone today to discuss her genetic testing results. Her blood was drawn on 7/26/24. A Custom Panel (a combination of the Common Hereditary Cancers Panel + the Hereditary Skin Cancer Panel, including preliminary-evidence genes for skin cancer) was ordered from [facility]. This testing was done because of Dilma's personal and family history of cancer.    Genetic Testing Result: NEGATIVE  Dilma is negative for mutations in the 59 genes analyzed: APC, DEQUAN, AXIN2, BAP1, BARD1, BLM, BMPR1A, BRCA1, BRCA2, BRIP1, CDH1, CDK4, CDKN2A, CHEK2, CTNNA1, DICER1, EPCAM, FH, FLCN, GREM1, HOXB13, KIT, MBD4, MC1R, MEN1, MITF, MLH1, MSH2, MSH3, MSH6, MUTYH, NF1, NTHL1, PALB2, PDGFRA, PMS2, POLD1, POLE, POT1, PTCH1, PTCH2, PTEN, RAD51C, RAD51D, RB1, SDHA, SDHB, SDHC, SDHD, SMAD4, SMARCA4, STK11, SUFU, TERT, TP53, TSC1, TSC2, VHL, WRN.     A copy of the test report can be found in the Laboratory tab, dated 7/26/24, and named \"LABORATORY MISCELLANEOUS ORDER\". The report is scanned in as a linked document.    Interpretation:  We discussed several different interpretations of this negative test result.    One explanation may be that there is a different gene or combination of genes and environment that are associated with the cancers in this family.  It is possible that her relatives have a mutation in one of these genes and she did not inherit it.  There is also a small possibility that there is a mutation in one of these genes, and the testing laboratory could not find it with their current testing methods.       Screening:  Based on this negative test result, it is important for Dilma and her relatives to refer back to the family history " for appropriate cancer screening.    She should continue with her breast cancer care as recommended by her oncology team.   Dilma's close female relatives remain at increased risk for breast cancer given their family history. Breast cancer screening is generally recommended to begin approximately 10 years younger than the earliest age of breast cancer diagnosis in the family, or at age 40, whichever comes first. Breast screening options should be discussed with an individual's primary care provider and a genetic counselor, to determine at what age to begin screening, what screening is appropriate, and if additional screening (such as breast MRI) is necessary based on personal/family history factors.       She should continue with her skin exams as recommended by dermatology based on her personal history of skin cancers.  Other population cancer screening options, such as those recommended by the American Cancer Society and the National Comprehensive Cancer Network (NCCN), are also appropriate for Dilma and her family. These screening recommendations may change if there are changes to Dilma's personal and/or family history of cancer. Final screening recommendations should be made in consultation with each individual's primary care provider.     Inheritance:  We reviewed the autosomal dominant inheritance of mutations in these genes. We discussed that Dilma cannot/did not pass on an identifiable mutation in these genes to her children based on this test result. Mutations in these genes do not skip generations.      Summary:  We do not have an explanation for Dilma's personal or family history of cancer. While no genetic changes were identified, Dilma may still be at risk for certain cancers due to family history, environmental factors, or other genetic causes not identified by this test. Because of that, it is important that she continue with cancer screening based on her personal and family history as discussed  above.    Genetic testing is rapidly advancing, and new cancer susceptibility genes will most likely be identified in the future. Therefore, I encouraged Dilma to contact me annually or if there are changes in her personal or family history. This may change how we assess her cancer risk, screening, and the testing we would offer.    Plan:  1. A copy of her results was released to her today via the online [employer] portal.   2. She plans to follow-up with her physicians.  3. She should contact me regularly, or sooner if her family history changes.    If Dilma has any further questions, I encouraged her to contact me at [phone].    Nancy Potts MS, [facility]  Licensed, Certified Genetic Counselor  Office: [phone]  Email: [email or web address]

## 2024-08-22 NOTE — LETTER
"8/22/2024      Dilma Crockett  [address] [address]      Dear Colleague,    Thank you for referring your patient, Dilma Crockett, to the [facility]. Please see a copy of my visit note below.    Virtual Visit Details    Type of service:  Telephone Visit   Phone call duration: 12 minutes   Originating Location (pt. Location): Home  Distant Location (provider location):  Off-site    8/22/2024    Referring Provider: Swati Pabon MD     Presenting Information:  I spoke to Dilma over the phone today to discuss her genetic testing results. Her blood was drawn on 7/26/24. A Custom Panel (a combination of the Common Hereditary Cancers Panel + the Hereditary Skin Cancer Panel, including preliminary-evidence genes for skin cancer) was ordered from [employer]. This testing was done because of Dilma's personal and family history of cancer.    Genetic Testing Result: NEGATIVE  Dilma is negative for mutations in the 59 genes analyzed: APC, DEQUAN, AXIN2, BAP1, BARD1, BLM, BMPR1A, BRCA1, BRCA2, BRIP1, CDH1, CDK4, CDKN2A, CHEK2, CTNNA1, DICER1, EPCAM, FH, FLCN, GREM1, HOXB13, KIT, MBD4, MC1R, MEN1, MITF, MLH1, MSH2, MSH3, MSH6, MUTYH, NF1, NTHL1, PALB2, PDGFRA, PMS2, POLD1, POLE, POT1, PTCH1, PTCH2, PTEN, RAD51C, RAD51D, RB1, SDHA, SDHB, SDHC, SDHD, SMAD4, SMARCA4, STK11, SUFU, TERT, TP53, TSC1, TSC2, VHL, WRN.     A copy of the test report can be found in the Laboratory tab, dated 7/26/24, and named \"LABORATORY MISCELLANEOUS ORDER\". The report is scanned in as a linked document.    Interpretation:  We discussed several different interpretations of this negative test result.    One explanation may be that there is a different gene or combination of genes and environment that are associated with the cancers in this family.  It is possible that her relatives have a mutation in one of these genes and she did not inherit it.  There is also a small possibility that there is a mutation " in one of these genes, and the testing laboratory could not find it with their current testing methods.       Screening:  Based on this negative test result, it is important for Dilma and her relatives to refer back to the family history for appropriate cancer screening.    She should continue with her breast cancer care as recommended by her oncology team.   Dilma's close female relatives remain at increased risk for breast cancer given their family history. Breast cancer screening is generally recommended to begin approximately 10 years younger than the earliest age of breast cancer diagnosis in the family, or at age 40, whichever comes first. Breast screening options should be discussed with an individual's primary care provider and a genetic counselor, to determine at what age to begin screening, what screening is appropriate, and if additional screening (such as breast MRI) is necessary based on personal/family history factors.       She should continue with her skin exams as recommended by dermatology based on her personal history of skin cancers.  Other population cancer screening options, such as those recommended by the American Cancer Society and the National Comprehensive Cancer Network (NCCN), are also appropriate for Dilma and her family. These screening recommendations may change if there are changes to Dilma's personal and/or family history of cancer. Final screening recommendations should be made in consultation with each individual's primary care provider.     Inheritance:  We reviewed the autosomal dominant inheritance of mutations in these genes. We discussed that Dilma cannot/did not pass on an identifiable mutation in these genes to her children based on this test result. Mutations in these genes do not skip generations.      Summary:  We do not have an explanation for Dilma's personal or family history of cancer. While no genetic changes were identified, Dilma may still be at risk for certain  cancers due to family history, environmental factors, or other genetic causes not identified by this test. Because of that, it is important that she continue with cancer screening based on her personal and family history as discussed above.    Genetic testing is rapidly advancing, and new cancer susceptibility genes will most likely be identified in the future. Therefore, I encouraged Dilma to contact me annually or if there are changes in her personal or family history. This may change how we assess her cancer risk, screening, and the testing we would offer.    Plan:  1. A copy of her results was released to her today via the online [employer] portal.   2. She plans to follow-up with her physicians.  3. She should contact me regularly, or sooner if her family history changes.    If Dilma has any further questions, I encouraged her to contact me at [phone].    Nancy Potts MS, [facility]  Licensed, Certified Genetic Counselor  Office: [phone]  Email: [email or web address]       Again, thank you for allowing me to participate in the care of your patient.        Sincerely,        Nancy Potts GC

## 2024-09-04 PROBLEM — C50.312 MALIGNANT NEOPLASM OF LOWER-INNER QUADRANT OF LEFT FEMALE BREAST (H): Status: ACTIVE | Noted: 2024-09-04

## 2024-09-27 ENCOUNTER — LAB (OUTPATIENT)
Dept: INFUSION THERAPY | Facility: CLINIC | Age: 69
End: 2024-09-27
Attending: INTERNAL MEDICINE
Payer: COMMERCIAL

## 2024-09-27 DIAGNOSIS — E55.9 VITAMIN D DEFICIENCY: ICD-10-CM

## 2024-09-27 DIAGNOSIS — Z79.811 LONG TERM (CURRENT) USE OF AROMATASE INHIBITORS: ICD-10-CM

## 2024-09-27 DIAGNOSIS — C50.312 MALIGNANT NEOPLASM OF LOWER-INNER QUADRANT OF LEFT BREAST IN FEMALE, ESTROGEN RECEPTOR POSITIVE (H): ICD-10-CM

## 2024-09-27 DIAGNOSIS — Z17.0 MALIGNANT NEOPLASM OF LOWER-INNER QUADRANT OF LEFT BREAST IN FEMALE, ESTROGEN RECEPTOR POSITIVE (H): ICD-10-CM

## 2024-09-27 LAB
ALBUMIN SERPL BCG-MCNC: 4.1 G/DL (ref 3.5–5.2)
ALP SERPL-CCNC: 70 U/L (ref 40–150)
ALT SERPL W P-5'-P-CCNC: 14 U/L (ref 0–50)
ANION GAP SERPL CALCULATED.3IONS-SCNC: 10 MMOL/L (ref 7–15)
AST SERPL W P-5'-P-CCNC: 24 U/L (ref 0–45)
BASOPHILS # BLD AUTO: 0.1 10E3/UL (ref 0–0.2)
BASOPHILS NFR BLD AUTO: 1 %
BILIRUB SERPL-MCNC: 0.4 MG/DL
BUN SERPL-MCNC: 23.4 MG/DL (ref 8–23)
CALCIUM SERPL-MCNC: 9.3 MG/DL (ref 8.8–10.4)
CANCER AG15-3 SERPL-ACNC: 12 U/ML
CEA SERPL-MCNC: 3.2 NG/ML
CHLORIDE SERPL-SCNC: 102 MMOL/L (ref 98–107)
CREAT SERPL-MCNC: 1 MG/DL (ref 0.51–0.95)
EGFRCR SERPLBLD CKD-EPI 2021: 61 ML/MIN/1.73M2
EOSINOPHIL # BLD AUTO: 0.2 10E3/UL (ref 0–0.7)
EOSINOPHIL NFR BLD AUTO: 4 %
ERYTHROCYTE [DISTWIDTH] IN BLOOD BY AUTOMATED COUNT: 12 % (ref 10–15)
GLUCOSE SERPL-MCNC: 96 MG/DL (ref 70–99)
HCO3 SERPL-SCNC: 27 MMOL/L (ref 22–29)
HCT VFR BLD AUTO: 43.3 % (ref 35–47)
HGB BLD-MCNC: 14.3 G/DL (ref 11.7–15.7)
IMM GRANULOCYTES # BLD: 0 10E3/UL
IMM GRANULOCYTES NFR BLD: 0 %
LYMPHOCYTES # BLD AUTO: 0.9 10E3/UL (ref 0.8–5.3)
LYMPHOCYTES NFR BLD AUTO: 20 %
MCH RBC QN AUTO: 31.6 PG (ref 26.5–33)
MCHC RBC AUTO-ENTMCNC: 33 G/DL (ref 31.5–36.5)
MCV RBC AUTO: 96 FL (ref 78–100)
MONOCYTES # BLD AUTO: 0.3 10E3/UL (ref 0–1.3)
MONOCYTES NFR BLD AUTO: 7 %
NEUTROPHILS # BLD AUTO: 3.1 10E3/UL (ref 1.6–8.3)
NEUTROPHILS NFR BLD AUTO: 69 %
NRBC # BLD AUTO: 0 10E3/UL
NRBC BLD AUTO-RTO: 0 /100
PLATELET # BLD AUTO: 224 10E3/UL (ref 150–450)
POTASSIUM SERPL-SCNC: 4.2 MMOL/L (ref 3.4–5.3)
PROT SERPL-MCNC: 6.5 G/DL (ref 6.4–8.3)
RBC # BLD AUTO: 4.52 10E6/UL (ref 3.8–5.2)
SODIUM SERPL-SCNC: 139 MMOL/L (ref 135–145)
WBC # BLD AUTO: 4.6 10E3/UL (ref 4–11)

## 2024-09-27 PROCEDURE — 82378 CARCINOEMBRYONIC ANTIGEN: CPT | Performed by: INTERNAL MEDICINE

## 2024-09-27 PROCEDURE — 85025 COMPLETE CBC W/AUTO DIFF WBC: CPT | Performed by: INTERNAL MEDICINE

## 2024-09-27 PROCEDURE — 86300 IMMUNOASSAY TUMOR CA 15-3: CPT | Performed by: INTERNAL MEDICINE

## 2024-09-27 PROCEDURE — 36415 COLL VENOUS BLD VENIPUNCTURE: CPT

## 2024-09-27 PROCEDURE — 82040 ASSAY OF SERUM ALBUMIN: CPT | Performed by: INTERNAL MEDICINE

## 2024-09-27 RX ORDER — LETROZOLE 2.5 MG/1
2.5 TABLET, FILM COATED ORAL DAILY
Qty: 90 TABLET | Refills: 3 | Status: SHIPPED | OUTPATIENT
Start: 2024-09-27

## 2024-09-27 NOTE — PROGRESS NOTES
Medical Assistant Note:  Dilma Crockett presents today for blood draw.    Patient seen by provider today: No.   present during visit today: Not Applicable.    Concerns: No Concerns.    Procedure:  Lab draw site: rac, Needle type: bf, Gauge: 23.    Post Assessment:  Labs drawn without difficulty: Yes.    Discharge Plan:  Departure Mode: Ambulatory.    Face to Face Time: 5 min.    Any Chacon, CMA

## 2024-10-02 ENCOUNTER — ONCOLOGY VISIT (OUTPATIENT)
Dept: ONCOLOGY | Facility: CLINIC | Age: 69
End: 2024-10-02
Attending: FAMILY MEDICINE
Payer: COMMERCIAL

## 2024-10-02 ENCOUNTER — INFUSION THERAPY VISIT (OUTPATIENT)
Dept: INFUSION THERAPY | Facility: CLINIC | Age: 69
End: 2024-10-02
Attending: FAMILY MEDICINE
Payer: COMMERCIAL

## 2024-10-02 VITALS
DIASTOLIC BLOOD PRESSURE: 86 MMHG | TEMPERATURE: 97.7 F | RESPIRATION RATE: 16 BRPM | OXYGEN SATURATION: 98 % | BODY MASS INDEX: 23.22 KG/M2 | SYSTOLIC BLOOD PRESSURE: 123 MMHG | HEIGHT: 61 IN | WEIGHT: 123 LBS | HEART RATE: 74 BPM

## 2024-10-02 DIAGNOSIS — Z79.811 LONG TERM (CURRENT) USE OF AROMATASE INHIBITORS: ICD-10-CM

## 2024-10-02 DIAGNOSIS — C50.312 MALIGNANT NEOPLASM OF LOWER-INNER QUADRANT OF LEFT BREAST IN FEMALE, ESTROGEN RECEPTOR POSITIVE (H): ICD-10-CM

## 2024-10-02 DIAGNOSIS — Z17.0 MALIGNANT NEOPLASM OF LOWER-INNER QUADRANT OF LEFT BREAST IN FEMALE, ESTROGEN RECEPTOR POSITIVE (H): ICD-10-CM

## 2024-10-02 DIAGNOSIS — E55.9 VITAMIN D DEFICIENCY: ICD-10-CM

## 2024-10-02 DIAGNOSIS — Z17.0 MALIGNANT NEOPLASM OF LOWER-INNER QUADRANT OF LEFT BREAST IN FEMALE, ESTROGEN RECEPTOR POSITIVE (H): Primary | ICD-10-CM

## 2024-10-02 DIAGNOSIS — C50.312 MALIGNANT NEOPLASM OF LOWER-INNER QUADRANT OF LEFT BREAST IN FEMALE, ESTROGEN RECEPTOR POSITIVE (H): Primary | ICD-10-CM

## 2024-10-02 DIAGNOSIS — Z79.811 LONG TERM (CURRENT) USE OF AROMATASE INHIBITORS: Primary | ICD-10-CM

## 2024-10-02 PROCEDURE — 250N000011 HC RX IP 250 OP 636: Performed by: INTERNAL MEDICINE

## 2024-10-02 PROCEDURE — 258N000003 HC RX IP 258 OP 636: Performed by: INTERNAL MEDICINE

## 2024-10-02 PROCEDURE — 96365 THER/PROPH/DIAG IV INF INIT: CPT

## 2024-10-02 PROCEDURE — 99213 OFFICE O/P EST LOW 20 MIN: CPT | Performed by: INTERNAL MEDICINE

## 2024-10-02 PROCEDURE — 99215 OFFICE O/P EST HI 40 MIN: CPT | Performed by: INTERNAL MEDICINE

## 2024-10-02 RX ORDER — ZOLEDRONIC ACID 0.04 MG/ML
4 INJECTION, SOLUTION INTRAVENOUS ONCE
Status: CANCELLED | OUTPATIENT
Start: 2024-10-02 | End: 2024-10-02

## 2024-10-02 RX ORDER — HEPARIN SODIUM,PORCINE 10 UNIT/ML
5-20 VIAL (ML) INTRAVENOUS DAILY PRN
Status: CANCELLED | OUTPATIENT
Start: 2024-10-02

## 2024-10-02 RX ORDER — HEPARIN SODIUM (PORCINE) LOCK FLUSH IV SOLN 100 UNIT/ML 100 UNIT/ML
5 SOLUTION INTRAVENOUS
Status: CANCELLED | OUTPATIENT
Start: 2024-10-02

## 2024-10-02 RX ADMIN — ZOLEDRONIC ACID 3.3 MG: 4 INJECTION, SOLUTION, CONCENTRATE INTRAVENOUS at 10:46

## 2024-10-02 ASSESSMENT — PAIN SCALES - GENERAL: PAINLEVEL: NO PAIN (0)

## 2024-10-02 NOTE — PROGRESS NOTES
Infusion Nursing Note:  Dilma Crockett presents today for Zometa.    Patient seen by provider today: Yes: Dr. Delaney   present during visit today: Not Applicable.    Note: N/A.      Intravenous Access:  Peripheral IV placed.    Treatment Conditions:  Lab Results   Component Value Date    HGB 14.3 09/27/2024    WBC 4.6 09/27/2024    ANEU 9.1 (H) 07/22/2016    ANEUTAUTO 3.1 09/27/2024     09/27/2024        Lab Results   Component Value Date     09/27/2024    POTASSIUM 4.2 09/27/2024    CR 1.00 (H) 09/27/2024    ALBERT 9.3 09/27/2024    BILITOTAL 0.4 09/27/2024    ALBUMIN 4.1 09/27/2024    ALT 14 09/27/2024    AST 24 09/27/2024       Results reviewed, labs MET treatment parameters, ok to proceed with treatment.      Post Infusion Assessment:  Patient tolerated infusion without incident.  Blood return noted pre and post infusion.  Site patent and intact, free from redness, edema or discomfort.  No evidence of extravasations.  Access discontinued per protocol.       Discharge Plan:   Patient declined prescription refills.  Discharge instructions reviewed with: Patient and daughter.  Patient and family verbalized understanding of discharge instructions and all questions answered.  AVS to patient via [employer].  Patient will return as scheduled for next appointment.   Patient discharged in stable condition accompanied by: self.  Departure Mode: Ambulatory.      Kimi Gleason RN

## 2024-10-02 NOTE — LETTER
"10/2/2024      Dilma Crockett  [address] [address]      Dear Colleague,    Thank you for referring your patient, Dilma Crockett, to the [facility]. Please see a copy of my visit note below.    Oncology Rooming Note    October 2, 2024 9:37 AM   Dilma Crockett is a 69 year old female who presents for:    Chief Complaint   Patient presents with     Oncology Clinic Visit     Initial Vitals: /86   Pulse 74   Temp 97.7  F (36.5  C) (Oral)   Resp 16   SpO2 98%  Estimated body mass index is 23.39 kg/m  as calculated from the following:    Height as of 6/5/24: 1.549 m (5' 1\").    Weight as of 6/5/24: 56.2 kg (123 lb 12.8 oz). There is no height or weight on file to calculate BSA.  No Pain (0) Comment: Data Unavailable   No LMP recorded. Patient has had a hysterectomy.  Allergies reviewed: Yes  Medications reviewed: Yes    Medications: Medication refills not needed today.  Pharmacy name entered into [id number] [place] [address]    Frailty Screening:   Is the patient here for a new oncology consult visit in cancer care? 2. No        Mami Madison, [facility]    Hematology/Oncology return patient note      Today's Date: October 2, 2024    Reason for Consult: breast cancer       HISTORY OF PRESENT ILLNESS: Dilma is a very pleasant 69-year-old female with the following oncologic diagnoses  1.  Bilateral screening mammogram 3/12/2024 showed possible asymmetry in the right breast at 6 o'clock position posterior depth  2.  Possible mass in the left breast at 9 o'clock position anterior depth and asymmetry at 9:00 posterior  3.  Diagnostic imaging followed showed in the left breast 3 o'clock position anterior depth irregular hyperdense mass with internal calcifications spiculated margins measuring 1.5 cm question asymmetry in the left breast 9 o'clock position posterior depth dissipates on spot compression. "  Question asymmetry in the right breast on the cc view retroareolar plane at posterior depth dissipates on spot compression  4.  3/28/2024 ultrasound in the hypoechoic mass left breast at 8 o'clock position 2 cm from the nipple measuring 1.5 cm showed invasive ductal carcinoma grade 1 ER +100% strong WA +91 to 200% strong HER2/callum IHC 0.  Evaluation of other areas showed normal breast tissue.  Evaluation of left axilla showed normal lymph nodes  5.  Status post lumpectomy on 5/3/2024 showed invasive ductal carcinoma left breast measuring 17 x 16 x 12 mm with no lymphovascular invasion associated DCIS nuclear grade 1 with focal numerous areas of atypical lobular hyperplasia and LCIS margins negative.  No regional lymph nodes submitted.  6.  Meets with radiation oncology today  7.  Bone density scan showed osteoporosis and is currently on Fosamax since February 2024  8.  Has a dog named Cyndee son coming in October  9.  Takes vitamin D3 2000/day and Tums for calcium  10 Oncotype 12  11 on letrozole June 2024      Dilma returns for follow up. Letrozole working well. On alendronate       REVIEW OF SYSTEMS:   14 point ROS was reviewed and is negative other than as noted above in HPI.       HOME MEDICATIONS:  Current Outpatient Medications   Medication Sig Dispense Refill     alendronate (FOSAMAX) 10 MG tablet Take 10 mg by mouth every morning (before breakfast)       Calcium Carbonate Antacid (TUMS PO) Take 1,000 mg by mouth       Cholecalciferol (VITAMIN D-3 PO) Take 2,000 Units by mouth daily.       letrozole (FEMARA) 2.5 MG tablet Take 1 tablet (2.5 mg) by mouth daily. 90 tablet 3         ALLERGIES:  Allergies   Allergen Reactions     Sulfa Antibiotics Rash         PAST MEDICAL HISTORY:  Past Medical History:   Diagnosis Date     Gall stone      Hyperlipidemia      Invasive ductal carcinoma of breast, left (H)      Kidney stone      Left knee pain      Motion sickness      Osteoporosis      Skin cancer   "        PAST SURGICAL HISTORY:  Past Surgical History:   Procedure Laterality Date     ABDOMEN SURGERY           BIOPSY, BREAST, WITH RADIOFREQUENCY IDENTIFICATION Left 5/3/2024    Procedure: Left breast tag localized lumpectomy;  Surgeon: Swati Pabon MD;  Location: [facility]     EXTRACORPOREAL SHOCK WAVE LITHOTRIPSY (ESWL) Bilateral 2014    Procedure: EXTRACORPOREAL SHOCK WAVE LITHOTRIPSY (ESWL);  Surgeon: Donato Baldwin MD;  Location: [facility] OR     GYN SURGERY      Hysterectomy     MYOMECTOMY       SOFT TISSUE SURGERY      Mohs procedure         SOCIAL HISTORY:  Social History     Socioeconomic History     Marital status:      Spouse name: Not on file     Number of children: Not on file     Years of education: Not on file     Highest education level: Not on file   Occupational History     Not on file   Tobacco Use     Smoking status: Never     Smokeless tobacco: Never   Vaping Use     Vaping status: Never Used   Substance and Sexual Activity     Alcohol use: Yes     Comment: occ, 2-4x/ year     Drug use: No     Sexual activity: Not on file   Other Topics Concern     Not on file   Social History Narrative     Not on file     Social Determinants of Health     Financial Resource Strain: Not on file   Food Insecurity: Not on file   Transportation Needs: Not on file   Physical Activity: Not on file   Stress: Not on file   Social Connections: Not on file   Interpersonal Safety: Not on file   Housing Stability: Not on file     Works as a .   also works full-time    FAMILY HISTORY:  Family History   Problem Relation Age of Onset     Breast Cancer Maternal Grandmother          PHYSICAL EXAM:  Vital signs:  /86   Pulse 74   Temp 97.7  F (36.5  C) (Oral)   Resp 16   Ht 1.549 m (5' 1\")   Wt 55.8 kg (123 lb)   SpO2 98%   BMI 23.24 kg/m     ECO  GENERAL/CONSTITUTIONAL: No acute distress.  EYES: Pupils are equal, round, and react to light and accommodation. Extraocular " movements intact.  No scleral icterus.  ENT/MOUTH: Neck supple. Oropharynx clear, no mucositis.  LYMPH: No anterior cervical, posterior cervical, supraclavicular, axillary or inguinal adenopathy.   RESPIRATORY: Clear to auscultation bilaterally. No crackles or wheezing.   CARDIOVASCULAR: Regular rate and rhythm without murmurs, gallops, or rubs.  GASTROINTESTINAL: No hepatosplenomegaly, masses, or tenderness. The patient has normal bowel sounds. No guarding.  No distention.  MUSCULOSKELETAL: Warm and well-perfused, no cyanosis, clubbing, or edema.  NEUROLOGIC: Cranial nerves II-XII are intact. Alert, oriented, answers questions appropriately.  INTEGUMENTARY: No rashes or jaundice.  GAIT: Steady, does not use assistive device  Status post left breast lumpectomy healing very well no evidence of recurrence.  Right breast normal      LABS:  Stable.       PATHOLOGY:  As per HPI    IMAGING:  As per HPI    ASSESSMENT/PLAN:  Dilma is a very pleasant 69-year-old female who has a new diagnosis of stage I ER/MN positive HER2/callum negative breast cancer status postlumpectomy    Discussed the role of endocrine therapy with aromatase inhibitors over tamoxifen showing small benefit.  Side effects of letrozole explained written information given.  She also has osteoporosis my recommendation would be to proceed with Zometa every 6 months for 3 years with antitumor activity and bone benefit.  She can continue Fosamax for the next 4 months but we will schedule her for Zometa when she comes back and sees me in 4 months.  Lab work baseline today    Discussed excellent prognosis of this disease and incurable nature.  Her risk of recurrence is in the range of 8 to 10% with aromatase inhibitor therapy or dropped down to less than 2 to 3% with the addition of Zometa for distant disease      1.  Breast cancer:   continue letrozle   Mammogram left in December. Bilateral in march. Labs and MD visit in March  2.  Osteoporosis discontinue  fosamax, start zometa       Total time spent on day of visit, including review of tests, obtaining/reviewing separately obtained history, ordering medications/tests/procedures, communicating with PCP/consultants, and documenting in electronic medical record: 40 minutes         Reed Delaney MD  Hematology/Oncology  [facility]       Again, thank you for allowing me to participate in the care of your patient.        Sincerely,        Reed Delaney MD

## 2024-10-02 NOTE — PROGRESS NOTES
"Oncology Rooming Note    October 2, 2024 9:37 AM   Dilma Crockett is a 69 year old female who presents for:    Chief Complaint   Patient presents with    Oncology Clinic Visit     Initial Vitals: /86   Pulse 74   Temp 97.7  F (36.5  C) (Oral)   Resp 16   SpO2 98%  Estimated body mass index is 23.39 kg/m  as calculated from the following:    Height as of 6/5/24: 1.549 m (5' 1\").    Weight as of 6/5/24: 56.2 kg (123 lb 12.8 oz). There is no height or weight on file to calculate BSA.  No Pain (0) Comment: Data Unavailable   No LMP recorded. Patient has had a hysterectomy.  Allergies reviewed: Yes  Medications reviewed: Yes    Medications: Medication refills not needed today.  Pharm[facility] [id number] [place] [address]    Frailty Screening:   Is the patient here for a new oncology consult visit in cancer care? 2. No        Mami Madison CMA            "

## 2024-10-02 NOTE — PROGRESS NOTES
[facility]    Hematology/Oncology return patient note      Today's Date: October 2, 2024    Reason for Consult: breast cancer       HISTORY OF PRESENT ILLNESS: Dilma is a very pleasant 69-year-old female with the following oncologic diagnoses  1.  Bilateral screening mammogram 3/12/2024 showed possible asymmetry in the right breast at 6 o'clock position posterior depth  2.  Possible mass in the left breast at 9 o'clock position anterior depth and asymmetry at 9:00 posterior  3.  Diagnostic imaging followed showed in the left breast 3 o'clock position anterior depth irregular hyperdense mass with internal calcifications spiculated margins measuring 1.5 cm question asymmetry in the left breast 9 o'clock position posterior depth dissipates on spot compression.  Question asymmetry in the right breast on the cc view retroareolar plane at posterior depth dissipates on spot compression  4.  3/28/2024 ultrasound in the hypoechoic mass left breast at 8 o'clock position 2 cm from the nipple measuring 1.5 cm showed invasive ductal carcinoma grade 1 ER +100% strong HI +91 to 200% strong HER2/callum IHC 0.  Evaluation of other areas showed normal breast tissue.  Evaluation of left axilla showed normal lymph nodes  5.  Status post lumpectomy on 5/3/2024 showed invasive ductal carcinoma left breast measuring 17 x 16 x 12 mm with no lymphovascular invasion associated DCIS nuclear grade 1 with focal numerous areas of atypical lobular hyperplasia and LCIS margins negative.  No regional lymph nodes submitted.  6.  Meets with radiation oncology today  7.  Bone density scan showed osteoporosis and is currently on Fosamax since February 2024  8.  Has a dog named Cyndee son coming in October  9.  Takes vitamin D3 2000/day and Tums for calcium  10 Oncotype 12  11 on letrozole June 2024      Dilma returns for follow up. Letrozole working well. On alendronate       REVIEW OF SYSTEMS:   14 point ROS was reviewed  and is negative other than as noted above in HPI.       HOME MEDICATIONS:  Current Outpatient Medications   Medication Sig Dispense Refill    alendronate (FOSAMAX) 10 MG tablet Take 10 mg by mouth every morning (before breakfast)      Calcium Carbonate Antacid (TUMS PO) Take 1,000 mg by mouth      Cholecalciferol (VITAMIN D-3 PO) Take 2,000 Units by mouth daily.      letrozole (FEMARA) 2.5 MG tablet Take 1 tablet (2.5 mg) by mouth daily. 90 tablet 3         ALLERGIES:  Allergies   Allergen Reactions    Sulfa Antibiotics Rash         PAST MEDICAL HISTORY:  Past Medical History:   Diagnosis Date    Gall stone     Hyperlipidemia     Invasive ductal carcinoma of breast, left (H)     Kidney stone     Left knee pain     Motion sickness     Osteoporosis     Skin cancer          PAST SURGICAL HISTORY:  Past Surgical History:   Procedure Laterality Date    ABDOMEN SURGERY          BIOPSY, BREAST, WITH RADIOFREQUENCY IDENTIFICATION Left 5/3/2024    Procedure: Left breast tag localized lumpectomy;  Surgeon: Swati Pabon MD;  Location:  OR    EXTRACORPOREAL SHOCK WAVE LITHOTRIPSY (ESWL) Bilateral 2014    Procedure: EXTRACORPOREAL SHOCK WAVE LITHOTRIPSY (ESWL);  Surgeon: Donato Baldwin MD;  Location: [facility]    GYN SURGERY      Hysterectomy    MYOMECTOMY      SOFT TISSUE SURGERY      Mohs procedure         SOCIAL HISTORY:  Social History     Socioeconomic History    Marital status:      Spouse name: Not on file    Number of children: Not on file    Years of education: Not on file    Highest education level: Not on file   Occupational History    Not on file   Tobacco Use    Smoking status: Never    Smokeless tobacco: Never   Vaping Use    Vaping status: Never Used   Substance and Sexual Activity    Alcohol use: Yes     Comment: occ, 2-4x/ year    Drug use: No    Sexual activity: Not on file   Other Topics Concern    Not on file   Social History Narrative    Not on file     Social Determinants of Health  "    Financial Resource Strain: Not on file   Food Insecurity: Not on file   Transportation Needs: Not on file   Physical Activity: Not on file   Stress: Not on file   Social Connections: Not on file   Interpersonal Safety: Not on file   Housing Stability: Not on file     Works as a .   also works full-time    FAMILY HISTORY:  Family History   Problem Relation Age of Onset    Breast Cancer Maternal Grandmother          PHYSICAL EXAM:  Vital signs:  /86   Pulse 74   Temp 97.7  F (36.5  C) (Oral)   Resp 16   Ht 1.549 m (5' 1\")   Wt 55.8 kg (123 lb)   SpO2 98%   BMI 23.24 kg/m     ECO  GENERAL/CONSTITUTIONAL: No acute distress.  EYES: Pupils are equal, round, and react to light and accommodation. Extraocular movements intact.  No scleral icterus.  ENT/MOUTH: Neck supple. Oropharynx clear, no mucositis.  LYMPH: No anterior cervical, posterior cervical, supraclavicular, axillary or inguinal adenopathy.   RESPIRATORY: Clear to auscultation bilaterally. No crackles or wheezing.   CARDIOVASCULAR: Regular rate and rhythm without murmurs, gallops, or rubs.  GASTROINTESTINAL: No hepatosplenomegaly, masses, or tenderness. The patient has normal bowel sounds. No guarding.  No distention.  MUSCULOSKELETAL: Warm and well-perfused, no cyanosis, clubbing, or edema.  NEUROLOGIC: Cranial nerves II-XII are intact. Alert, oriented, answers questions appropriately.  INTEGUMENTARY: No rashes or jaundice.  GAIT: Steady, does not use assistive device  Status post left breast lumpectomy healing very well no evidence of recurrence.  Right breast normal      LABS:  Stable.       PATHOLOGY:  As per HPI    IMAGING:  As per HPI    ASSESSMENT/PLAN:  Dilma is a very pleasant 69-year-old female who has a new diagnosis of stage I ER/VT positive HER2/callum negative breast cancer status postlumpectomy    Discussed the role of endocrine therapy with aromatase inhibitors over tamoxifen showing small benefit.  Side effects " of letrozole explained written information given.  She also has osteoporosis my recommendation would be to proceed with Zometa every 6 months for 3 years with antitumor activity and bone benefit.  She can continue Fosamax for the next 4 months but we will schedule her for Zometa when she comes back and sees me in 4 months.  Lab work baseline today    Discussed excellent prognosis of this disease and incurable nature.  Her risk of recurrence is in the range of 8 to 10% with aromatase inhibitor therapy or dropped down to less than 2 to 3% with the addition of Zometa for distant disease      1.  Breast cancer:   continue letrozle   Mammogram left in December. Bilateral in march. Labs and MD visit in March  2.  Osteoporosis discontinue fosamax, start zometa       Total time spent on day of visit, including review of tests, obtaining/reviewing separately obtained history, ordering medications/tests/procedures, communicating with PCP/consultants, and documenting in electronic medical record: 40 minutes         Reed Delaney MD  Hematology/Oncology  [facility]

## 2024-12-02 ENCOUNTER — HOSPITAL ENCOUNTER (OUTPATIENT)
Dept: MAMMOGRAPHY | Facility: CLINIC | Age: 69
Discharge: HOME | End: 2024-12-02
Attending: SURGERY | Admitting: SURGERY
Payer: COMMERCIAL

## 2024-12-02 DIAGNOSIS — C50.912 HORMONE RECEPTOR POSITIVE BREAST CANCER, LEFT (H): ICD-10-CM

## 2024-12-02 PROCEDURE — 77065 DX MAMMO INCL CAD UNI: CPT | Mod: LT

## 2025-02-23 ENCOUNTER — HEALTH MAINTENANCE LETTER (OUTPATIENT)
Age: 70
End: 2025-02-23

## 2025-03-24 ENCOUNTER — HOSPITAL ENCOUNTER (OUTPATIENT)
Dept: MAMMOGRAPHY | Facility: CLINIC | Age: 70
Discharge: HOME | End: 2025-03-24
Attending: INTERNAL MEDICINE | Admitting: INTERNAL MEDICINE
Payer: COMMERCIAL

## 2025-03-24 ENCOUNTER — LAB (OUTPATIENT)
Dept: LAB | Facility: CLINIC | Age: 70
End: 2025-03-24
Payer: COMMERCIAL

## 2025-03-24 DIAGNOSIS — C50.312 MALIGNANT NEOPLASM OF LOWER-INNER QUADRANT OF LEFT BREAST IN FEMALE, ESTROGEN RECEPTOR POSITIVE (H): ICD-10-CM

## 2025-03-24 DIAGNOSIS — Z17.0 MALIGNANT NEOPLASM OF LOWER-INNER QUADRANT OF LEFT BREAST IN FEMALE, ESTROGEN RECEPTOR POSITIVE (H): ICD-10-CM

## 2025-03-24 DIAGNOSIS — E55.9 VITAMIN D DEFICIENCY: ICD-10-CM

## 2025-03-24 LAB
BASOPHILS # BLD AUTO: 0 10E3/UL (ref 0–0.2)
BASOPHILS NFR BLD AUTO: 0 %
EOSINOPHIL # BLD AUTO: 0 10E3/UL (ref 0–0.7)
EOSINOPHIL NFR BLD AUTO: 1 %
ERYTHROCYTE [DISTWIDTH] IN BLOOD BY AUTOMATED COUNT: 12.1 % (ref 10–15)
HCT VFR BLD AUTO: 45.5 % (ref 35–47)
HGB BLD-MCNC: 14.7 G/DL (ref 11.7–15.7)
IMM GRANULOCYTES # BLD: 0 10E3/UL
IMM GRANULOCYTES NFR BLD: 0 %
LYMPHOCYTES # BLD AUTO: 1 10E3/UL (ref 0.8–5.3)
LYMPHOCYTES NFR BLD AUTO: 17 %
MCH RBC QN AUTO: 31.4 PG (ref 26.5–33)
MCHC RBC AUTO-ENTMCNC: 32.3 G/DL (ref 31.5–36.5)
MCV RBC AUTO: 97 FL (ref 78–100)
MONOCYTES # BLD AUTO: 0.4 10E3/UL (ref 0–1.3)
MONOCYTES NFR BLD AUTO: 7 %
NEUTROPHILS # BLD AUTO: 4.8 10E3/UL (ref 1.6–8.3)
NEUTROPHILS NFR BLD AUTO: 76 %
PLATELET # BLD AUTO: 228 10E3/UL (ref 150–450)
RBC # BLD AUTO: 4.68 10E6/UL (ref 3.8–5.2)
WBC # BLD AUTO: 6.3 10E3/UL (ref 4–11)

## 2025-03-24 PROCEDURE — 82378 CARCINOEMBRYONIC ANTIGEN: CPT

## 2025-03-24 PROCEDURE — 77067 SCR MAMMO BI INCL CAD: CPT

## 2025-03-24 PROCEDURE — 77063 BREAST TOMOSYNTHESIS BI: CPT

## 2025-03-24 PROCEDURE — 80053 COMPREHEN METABOLIC PANEL: CPT

## 2025-03-24 PROCEDURE — 85025 COMPLETE CBC W/AUTO DIFF WBC: CPT

## 2025-03-24 PROCEDURE — 36415 COLL VENOUS BLD VENIPUNCTURE: CPT

## 2025-03-24 PROCEDURE — 86300 IMMUNOASSAY TUMOR CA 15-3: CPT

## 2025-03-25 LAB
ALBUMIN SERPL BCG-MCNC: 4.3 G/DL (ref 3.5–5.2)
ALP SERPL-CCNC: 67 U/L (ref 40–150)
ALT SERPL W P-5'-P-CCNC: 14 U/L (ref 0–50)
ANION GAP SERPL CALCULATED.3IONS-SCNC: 11 MMOL/L (ref 7–15)
AST SERPL W P-5'-P-CCNC: 25 U/L (ref 0–45)
BILIRUB SERPL-MCNC: 0.5 MG/DL
BUN SERPL-MCNC: 26.4 MG/DL (ref 8–23)
CALCIUM SERPL-MCNC: 10 MG/DL (ref 8.8–10.4)
CANCER AG15-3 SERPL-ACNC: 14 U/ML
CEA SERPL-MCNC: 4.1 NG/ML
CHLORIDE SERPL-SCNC: 104 MMOL/L (ref 98–107)
CREAT SERPL-MCNC: 1.1 MG/DL (ref 0.51–0.95)
EGFRCR SERPLBLD CKD-EPI 2021: 54 ML/MIN/1.73M2
GLUCOSE SERPL-MCNC: 124 MG/DL (ref 70–99)
HCO3 SERPL-SCNC: 27 MMOL/L (ref 22–29)
POTASSIUM SERPL-SCNC: 4.1 MMOL/L (ref 3.4–5.3)
PROT SERPL-MCNC: 6.7 G/DL (ref 6.4–8.3)
SODIUM SERPL-SCNC: 142 MMOL/L (ref 135–145)

## 2025-04-01 ENCOUNTER — INFUSION THERAPY VISIT (OUTPATIENT)
Dept: INFUSION THERAPY | Facility: CLINIC | Age: 70
End: 2025-04-01
Attending: INTERNAL MEDICINE
Payer: COMMERCIAL

## 2025-04-01 ENCOUNTER — ONCOLOGY VISIT (OUTPATIENT)
Dept: ONCOLOGY | Facility: CLINIC | Age: 70
End: 2025-04-01
Attending: INTERNAL MEDICINE
Payer: COMMERCIAL

## 2025-04-01 VITALS
SYSTOLIC BLOOD PRESSURE: 150 MMHG | DIASTOLIC BLOOD PRESSURE: 95 MMHG | BODY MASS INDEX: 22.67 KG/M2 | OXYGEN SATURATION: 97 % | HEART RATE: 98 BPM | WEIGHT: 120 LBS | RESPIRATION RATE: 16 BRPM

## 2025-04-01 DIAGNOSIS — C50.312 MALIGNANT NEOPLASM OF LOWER-INNER QUADRANT OF LEFT BREAST IN FEMALE, ESTROGEN RECEPTOR POSITIVE (H): Primary | ICD-10-CM

## 2025-04-01 DIAGNOSIS — Z17.0 MALIGNANT NEOPLASM OF LOWER-INNER QUADRANT OF LEFT BREAST IN FEMALE, ESTROGEN RECEPTOR POSITIVE (H): Primary | ICD-10-CM

## 2025-04-01 DIAGNOSIS — Z17.0 MALIGNANT NEOPLASM OF LOWER-INNER QUADRANT OF LEFT BREAST IN FEMALE, ESTROGEN RECEPTOR POSITIVE (H): ICD-10-CM

## 2025-04-01 DIAGNOSIS — C50.312 MALIGNANT NEOPLASM OF LOWER-INNER QUADRANT OF LEFT BREAST IN FEMALE, ESTROGEN RECEPTOR POSITIVE (H): ICD-10-CM

## 2025-04-01 DIAGNOSIS — R79.89 ELEVATED SERUM CREATININE: Primary | ICD-10-CM

## 2025-04-01 DIAGNOSIS — Z79.811 LONG TERM (CURRENT) USE OF AROMATASE INHIBITORS: ICD-10-CM

## 2025-04-01 PROCEDURE — G2211 COMPLEX E/M VISIT ADD ON: HCPCS | Performed by: INTERNAL MEDICINE

## 2025-04-01 PROCEDURE — 99213 OFFICE O/P EST LOW 20 MIN: CPT | Mod: 25 | Performed by: INTERNAL MEDICINE

## 2025-04-01 PROCEDURE — 250N000011 HC RX IP 250 OP 636: Performed by: INTERNAL MEDICINE

## 2025-04-01 PROCEDURE — 96365 THER/PROPH/DIAG IV INF INIT: CPT

## 2025-04-01 PROCEDURE — 258N000003 HC RX IP 258 OP 636: Performed by: INTERNAL MEDICINE

## 2025-04-01 PROCEDURE — 99215 OFFICE O/P EST HI 40 MIN: CPT | Performed by: INTERNAL MEDICINE

## 2025-04-01 RX ORDER — HEPARIN SODIUM (PORCINE) LOCK FLUSH IV SOLN 100 UNIT/ML 100 UNIT/ML
5 SOLUTION INTRAVENOUS
Status: CANCELLED | OUTPATIENT
Start: 2025-04-01

## 2025-04-01 RX ORDER — ZOLEDRONIC ACID 0.04 MG/ML
4 INJECTION, SOLUTION INTRAVENOUS ONCE
Status: CANCELLED | OUTPATIENT
Start: 2025-04-01 | End: 2025-04-01

## 2025-04-01 RX ORDER — HEPARIN SODIUM,PORCINE 10 UNIT/ML
5-20 VIAL (ML) INTRAVENOUS DAILY PRN
Status: CANCELLED | OUTPATIENT
Start: 2025-04-01

## 2025-04-01 RX ADMIN — ZOLEDRONIC ACID 3.3 MG: 4 INJECTION, SOLUTION, CONCENTRATE INTRAVENOUS at 14:24

## 2025-04-01 ASSESSMENT — PAIN SCALES - GENERAL: PAINLEVEL_OUTOF10: NO PAIN (0)

## 2025-04-01 NOTE — LETTER
4/1/2025      Dilma Crockett  [address] [address]      Dear Colleague,    Thank you for referring your patient, Dilma Crockett, to the [facility]. Please see a copy of my visit note below.    [facility]    Hematology/Oncology return patient note      Today's Date: April 1 2025    Reason for Consult: breast cancer       HISTORY OF PRESENT ILLNESS: Dilma is a very pleasant 69-year-old female with the following oncologic diagnoses  1.  Bilateral screening mammogram 3/12/2024 showed possible asymmetry in the right breast at 6 o'clock position posterior depth  2.  Possible mass in the left breast at 9 o'clock position anterior depth and asymmetry at 9:00 posterior  3.  Diagnostic imaging followed showed in the left breast 3 o'clock position anterior depth irregular hyperdense mass with internal calcifications spiculated margins measuring 1.5 cm question asymmetry in the left breast 9 o'clock position posterior depth dissipates on spot compression.  Question asymmetry in the right breast on the cc view retroareolar plane at posterior depth dissipates on spot compression  4.  3/28/2024 ultrasound in the hypoechoic mass left breast at 8 o'clock position 2 cm from the nipple measuring 1.5 cm showed invasive ductal carcinoma grade 1 ER +100% strong AK +91 to 200% strong HER2/callum IHC 0.  Evaluation of other areas showed normal breast tissue.  Evaluation of left axilla showed normal lymph nodes  5.  Status post lumpectomy on 5/3/2024 showed invasive ductal carcinoma left breast measuring 17 x 16 x 12 mm with no lymphovascular invasion associated DCIS nuclear grade 1 with focal numerous areas of atypical lobular hyperplasia and LCIS margins negative.  No regional lymph nodes submitted.  6.  Meets with radiation oncology today  7.  Bone density scan showed osteoporosis and is currently on Fosamax since February 2024  8.  Has a dog named Cyndee son  coming in .  Takes vitamin D3 2000/day and Tums for calcium  10 Oncotype 12  11 on letrozole 2024      Dilma returns for follow up. Letrozole working well. No new complaints . Due for zometa today first cycle went well. Does not hydrate well. Cr was 1.10 (GFR 54)  which is lower than she normally is . No advil use      REVIEW OF SYSTEMS:   14 point ROS was reviewed and is negative other than as noted above in HPI.       HOME MEDICATIONS:  Current Outpatient Medications   Medication Sig Dispense Refill     Calcium Carbonate Antacid (TUMS PO) Take 1,000 mg by mouth       Cholecalciferol (VITAMIN D-3 PO) Take 2,000 Units by mouth daily.       letrozole (FEMARA) 2.5 MG tablet Take 1 tablet (2.5 mg) by mouth daily. 90 tablet 3         ALLERGIES:  Allergies   Allergen Reactions     Sulfa Antibiotics Rash         PAST MEDICAL HISTORY:  Past Medical History:   Diagnosis Date     Gall stone      Hyperlipidemia      Invasive ductal carcinoma of breast, left (H)      Kidney stone      Left knee pain      Motion sickness      Osteoporosis      Skin cancer          PAST SURGICAL HISTORY:  Past Surgical History:   Procedure Laterality Date     ABDOMEN SURGERY           BIOPSY, BREAST, WITH RADIOFREQUENCY IDENTIFICATION Left 5/3/2024    Procedure: Left breast tag localized lumpectomy;  Surgeon: Swati Pabon MD;  Location:  OR     EXTRACORPOREAL SHOCK WAVE LITHOTRIPSY (ESWL) Bilateral 2014    Procedure: EXTRACORPOREAL SHOCK WAVE LITHOTRIPSY (ESWL);  Surgeon: Donato Baldwin MD;  Location:  OR     GYN SURGERY      Hysterectomy     MYOMECTOMY       SOFT TISSUE SURGERY      Mohs procedure         SOCIAL HISTORY:  Social History     Socioeconomic History     Marital status:      Spouse name: Not on file     Number of children: Not on file     Years of education: Not on file     Highest education level: Not on file   Occupational History     Not on file   Tobacco Use     Smoking status:  Never     Smokeless tobacco: Never   Vaping Use     Vaping status: Never Used   Substance and Sexual Activity     Alcohol use: Yes     Comment: occ, 2-4x/ year     Drug use: No     Sexual activity: Not on file   Other Topics Concern     Not on file   Social History Narrative     Not on file     Social Drivers of Health     Financial Resource Strain: Not on file   Food Insecurity: Not on file   Transportation Needs: Not on file   Physical Activity: Not on file   Stress: Not on file   Social Connections: Not on file   Interpersonal Safety: Not on file   Housing Stability: Not on file     Works as a .   also works full-time    FAMILY HISTORY:  Family History   Problem Relation Age of Onset     Breast Cancer Maternal Grandmother          PHYSICAL EXAM:  Vital signs:  BP (!) 150/95   Pulse 98   Resp 16   Wt 54.4 kg (120 lb)   SpO2 97%   BMI 22.67 kg/m     ECO  Bilateral breast exams normal no evidence of recurrence  No adenopathy      LABS:  Stable.       PATHOLOGY:  As per HPI    IMAGING:  As per HPI    ASSESSMENT/PLAN:  Dilma is a very pleasant 70-year-old female who has a new diagnosis of stage I ER/VA positive HER2/callum negative breast cancer status postlumpectomy and radiation on letrozole            1.  Breast cancer:   continue letrozole   Mammogram due 3/26/2026. Day after her last mammogram per insurance    2.  Osteoporosis discontinued fosamax, started zometa, dexa in .     3 Elevated Cr mild elevation may be dehydration, repeat in a week after hydration    6 month labs and md visit, infusion then     Total time spent on day of visit, including review of tests, obtaining/reviewing separately obtained history, ordering medications/tests/procedures, communicating with PCP/consultants, and documenting in electronic medical record: 40 minutes         Reed Delaney MD  Hematology/Oncology  [facility]       Again, thank you for allowing me to participate in the  care of your patient.        Sincerely,        Reed Delaney MD    Electronically signed

## 2025-04-01 NOTE — PROGRESS NOTES
Infusion Nursing Note:  Dilma Crockett presents today for zometa.    Patient seen by provider today: Yes: Rhett   present during visit today: Not Applicable.    Note: advised pt to start on a calcium with VIt D3 supplement if not already taking one.      Intravenous Access:  Peripheral IV placed.    Treatment Conditions:  Lab Results   Component Value Date     03/24/2025    POTASSIUM 4.1 03/24/2025    CR 1.10 (H) 03/24/2025    ALBERT 10.0 03/24/2025    BILITOTAL 0.5 03/24/2025    ALBUMIN 4.3 03/24/2025    ALT 14 03/24/2025    AST 25 03/24/2025       Results reviewed, labs MET treatment parameters, ok to proceed with treatment.      Post Infusion Assessment:  Patient tolerated infusion without incident.  Site patent and intact, free from redness, edema or discomfort.  No evidence of extravasations.  Access discontinued per protocol.       Discharge Plan:   Patient and/or family verbalized understanding of discharge instructions and all questions answered.  AVS to patient via [facility].  Patient will return  for next appointment.   Patient discharged in stable condition accompanied by: self.  Departure Mode: Ambulatory.      Vera Desai RN

## 2025-04-01 NOTE — PROGRESS NOTES
[facility]    Hematology/Oncology return patient note      Today's Date: April 1 2025    Reason for Consult: breast cancer       HISTORY OF PRESENT ILLNESS: Dilma is a very pleasant 69-year-old female with the following oncologic diagnoses  1.  Bilateral screening mammogram 3/12/2024 showed possible asymmetry in the right breast at 6 o'clock position posterior depth  2.  Possible mass in the left breast at 9 o'clock position anterior depth and asymmetry at 9:00 posterior  3.  Diagnostic imaging followed showed in the left breast 3 o'clock position anterior depth irregular hyperdense mass with internal calcifications spiculated margins measuring 1.5 cm question asymmetry in the left breast 9 o'clock position posterior depth dissipates on spot compression.  Question asymmetry in the right breast on the cc view retroareolar plane at posterior depth dissipates on spot compression  4.  3/28/2024 ultrasound in the hypoechoic mass left breast at 8 o'clock position 2 cm from the nipple measuring 1.5 cm showed invasive ductal carcinoma grade 1 ER +100% strong OH +91 to 200% strong HER2/callum IHC 0.  Evaluation of other areas showed normal breast tissue.  Evaluation of left axilla showed normal lymph nodes  5.  Status post lumpectomy on 5/3/2024 showed invasive ductal carcinoma left breast measuring 17 x 16 x 12 mm with no lymphovascular invasion associated DCIS nuclear grade 1 with focal numerous areas of atypical lobular hyperplasia and LCIS margins negative.  No regional lymph nodes submitted.  6.  Meets with radiation oncology today  7.  Bone density scan showed osteoporosis and is currently on Fosamax since February 2024  8.  Has a dog named Max.  New grandchild coming in October  9.  Takes vitamin D3 2000/day and Tums for calcium  10 Oncotype 12  11 on letrozole June 2024      Dilma returns for follow up. Letrozole working well. No new complaints . Due for zometa today first cycle went well. Does  not hydrate well. Cr was 1.10 (GFR 54)  which is lower than she normally is . No advil use      REVIEW OF SYSTEMS:   14 point ROS was reviewed and is negative other than as noted above in HPI.       HOME MEDICATIONS:  Current Outpatient Medications   Medication Sig Dispense Refill    Calcium Carbonate Antacid (TUMS PO) Take 1,000 mg by mouth      Cholecalciferol (VITAMIN D-3 PO) Take 2,000 Units by mouth daily.      letrozole (FEMARA) 2.5 MG tablet Take 1 tablet (2.5 mg) by mouth daily. 90 tablet 3         ALLERGIES:  Allergies   Allergen Reactions    Sulfa Antibiotics Rash         PAST MEDICAL HISTORY:  Past Medical History:   Diagnosis Date    Gall stone     Hyperlipidemia     Invasive ductal carcinoma of breast, left (H)     Kidney stone     Left knee pain     Motion sickness     Osteoporosis     Skin cancer          PAST SURGICAL HISTORY:  Past Surgical History:   Procedure Laterality Date    ABDOMEN SURGERY          BIOPSY, BREAST, WITH RADIOFREQUENCY IDENTIFICATION Left 5/3/2024    Procedure: Left breast tag localized lumpectomy;  Surgeon: Swati Pabon MD;  Location:  OR    EXTRACORPOREAL SHOCK WAVE LITHOTRIPSY (ESWL) Bilateral 2014    Procedure: EXTRACORPOREAL SHOCK WAVE LITHOTRIPSY (ESWL);  Surgeon: Donato Baldwin MD;  Location:  OR    GYN SURGERY      Hysterectomy    MYOMECTOMY      SOFT TISSUE SURGERY      Mohs procedure         SOCIAL HISTORY:  Social History     Socioeconomic History    Marital status:      Spouse name: Not on file    Number of children: Not on file    Years of education: Not on file    Highest education level: Not on file   Occupational History    Not on file   Tobacco Use    Smoking status: Never    Smokeless tobacco: Never   Vaping Use    Vaping status: Never Used   Substance and Sexual Activity    Alcohol use: Yes     Comment: occ, 2-4x/ year    Drug use: No    Sexual activity: Not on file   Other Topics Concern    Not on file   Social History  Narrative    Not on file     Social Drivers of Health     Financial Resource Strain: Not on file   Food Insecurity: Not on file   Transportation Needs: Not on file   Physical Activity: Not on file   Stress: Not on file   Social Connections: Not on file   Interpersonal Safety: Not on file   Housing Stability: Not on file     Works as a .   also works full-time    FAMILY HISTORY:  Family History   Problem Relation Age of Onset    Breast Cancer Maternal Grandmother          PHYSICAL EXAM:  Vital signs:  BP (!) 150/95   Pulse 98   Resp 16   Wt 54.4 kg (120 lb)   SpO2 97%   BMI 22.67 kg/m     ECO  Bilateral breast exams normal no evidence of recurrence  No adenopathy      LABS:  Stable.       PATHOLOGY:  As per HPI    IMAGING:  As per HPI    ASSESSMENT/PLAN:  Dilma is a very pleasant 70-year-old female who has a new diagnosis of stage I ER/VA positive HER2/callum negative breast cancer status postlumpectomy and radiation on letrozole            1.  Breast cancer:   continue letrozole   Mammogram due 3/26/2026. Day after her last mammogram per insurance    2.  Osteoporosis discontinued fosamax, started zometa, dexa in .     3 Elevated Cr mild elevation may be dehydration, repeat in a week after hydration    6 month labs and md visit, infusion then     Total time spent on day of visit, including review of tests, obtaining/reviewing separately obtained history, ordering medications/tests/procedures, communicating with PCP/consultants, and documenting in electronic medical record: 40 minutes         Reed Delaney MD  Hematology/Oncology  [facility]

## 2025-04-08 ENCOUNTER — LAB (OUTPATIENT)
Dept: LAB | Facility: CLINIC | Age: 70
End: 2025-04-08
Payer: COMMERCIAL

## 2025-04-08 DIAGNOSIS — R79.89 ELEVATED SERUM CREATININE: ICD-10-CM

## 2025-04-08 LAB
CREAT SERPL-MCNC: 0.93 MG/DL (ref 0.51–0.95)
EGFRCR SERPLBLD CKD-EPI 2021: 66 ML/MIN/1.73M2

## 2025-04-08 PROCEDURE — 36415 COLL VENOUS BLD VENIPUNCTURE: CPT

## 2025-04-08 PROCEDURE — 82565 ASSAY OF CREATININE: CPT

## 2025-04-13 ENCOUNTER — HEALTH MAINTENANCE LETTER (OUTPATIENT)
Age: 70
End: 2025-04-13

## (undated) DEVICE — PAD CHUX UNDERPAD 23X24" 7136

## (undated) DEVICE — BNDG ELASTIC 6" DBL LENGTH UNSTERILE 6611-16

## (undated) DEVICE — ESU PENCIL W/SMOKE EVAC NEPTUNE STRYKER 0703-046-000

## (undated) DEVICE — DRAPE BREAST/CHEST 29420

## (undated) DEVICE — DECANTER VIAL 2006S

## (undated) DEVICE — SET BREAST BIOPSY LOCALIZER 20 PROBE 8MM PENCIL 09-0006

## (undated) DEVICE — SYR BULB IRRIG 50ML LATEX FREE 0035280

## (undated) DEVICE — GOWN IMPERVIOUS BREATHABLE SMART LG 89015

## (undated) DEVICE — GLOVE BIOGEL PI MICRO INDICATOR UNDERGLOVE SZ 6.5 48965

## (undated) DEVICE — CLIP ETHICON LIGACLIP SM BLUE LT100

## (undated) DEVICE — SU VICRYL 3-0 SH 27" J316H

## (undated) DEVICE — ESU ELEC BLADE 2.75" COATED/INSULATED E1455

## (undated) DEVICE — ESU GROUND PAD UNIVERSAL W/O CORD

## (undated) DEVICE — PREP CHLORAPREP 26ML TINTED HI-LITE ORANGE 930815

## (undated) DEVICE — MARKER MARGIN MARKER STD 6 COLOR SGL USE MMS6

## (undated) DEVICE — DRSG STERI STRIP 1/2X4" R1547

## (undated) DEVICE — SOL WATER IRRIG 1000ML BOTTLE 2F7114

## (undated) DEVICE — LINEN TOWEL PACK X5 5464

## (undated) DEVICE — NDL 25GA 1.5" 305127

## (undated) DEVICE — SU VICRYL 2-0 SH 27" J317H

## (undated) DEVICE — PACK MINOR SBA15MIFSE

## (undated) DEVICE — SYR 10ML FINGER CONTROL W/O NDL 309695

## (undated) DEVICE — GLOVE BIOGEL PI MICRO SZ 6.0 48560

## (undated) DEVICE — SU SILK 2-0 FSL 18" 677G

## (undated) DEVICE — SU MONOCRYL 4-0 PS-2 18" UND Y496G

## (undated) RX ORDER — PROPOFOL 10 MG/ML
INJECTION, EMULSION INTRAVENOUS
Status: DISPENSED
Start: 2024-05-03

## (undated) RX ORDER — ONDANSETRON 2 MG/ML
INJECTION INTRAMUSCULAR; INTRAVENOUS
Status: DISPENSED
Start: 2024-05-03

## (undated) RX ORDER — CEFAZOLIN SODIUM/WATER 2 G/20 ML
SYRINGE (ML) INTRAVENOUS
Status: DISPENSED
Start: 2024-05-03

## (undated) RX ORDER — FENTANYL CITRATE 50 UG/ML
INJECTION, SOLUTION INTRAMUSCULAR; INTRAVENOUS
Status: DISPENSED
Start: 2024-05-03

## (undated) RX ORDER — DEXAMETHASONE SODIUM PHOSPHATE 4 MG/ML
INJECTION, SOLUTION INTRA-ARTICULAR; INTRALESIONAL; INTRAMUSCULAR; INTRAVENOUS; SOFT TISSUE
Status: DISPENSED
Start: 2024-05-03

## (undated) RX ORDER — ACETAMINOPHEN 500 MG
TABLET ORAL
Status: DISPENSED
Start: 2024-05-03